# Patient Record
Sex: MALE | Race: WHITE | NOT HISPANIC OR LATINO | Employment: OTHER | URBAN - METROPOLITAN AREA
[De-identification: names, ages, dates, MRNs, and addresses within clinical notes are randomized per-mention and may not be internally consistent; named-entity substitution may affect disease eponyms.]

---

## 2024-04-01 ENCOUNTER — APPOINTMENT (EMERGENCY)
Dept: RADIOLOGY | Facility: HOSPITAL | Age: 74
DRG: 684 | End: 2024-04-01
Payer: COMMERCIAL

## 2024-04-01 ENCOUNTER — HOSPITAL ENCOUNTER (INPATIENT)
Facility: HOSPITAL | Age: 74
LOS: 2 days | Discharge: HOME | DRG: 684 | End: 2024-04-03
Attending: EMERGENCY MEDICINE | Admitting: INTERNAL MEDICINE
Payer: COMMERCIAL

## 2024-04-01 DIAGNOSIS — B02.9 SHINGLES: Primary | ICD-10-CM

## 2024-04-01 DIAGNOSIS — N17.9 AKI (ACUTE KIDNEY INJURY) (HCC): ICD-10-CM

## 2024-04-01 DIAGNOSIS — I10 HTN (HYPERTENSION): ICD-10-CM

## 2024-04-01 PROBLEM — M54.16 LUMBAR RADICULOPATHY: Status: ACTIVE | Noted: 2024-04-01

## 2024-04-01 PROBLEM — R11.0 NAUSEA: Status: ACTIVE | Noted: 2024-04-01

## 2024-04-01 LAB
2HR DELTA HS TROPONIN: -1 NG/L
4HR DELTA HS TROPONIN: 1 NG/L
ALBUMIN SERPL BCP-MCNC: 3.7 G/DL (ref 3.5–5)
ALP SERPL-CCNC: 127 U/L (ref 34–104)
ALT SERPL W P-5'-P-CCNC: 39 U/L (ref 7–52)
AMORPH URATE CRY URNS QL MICRO: ABNORMAL
ANION GAP SERPL CALCULATED.3IONS-SCNC: 13 MMOL/L (ref 4–13)
APTT PPP: 24 SECONDS (ref 23–37)
AST SERPL W P-5'-P-CCNC: 22 U/L (ref 13–39)
ATRIAL RATE: 98 BPM
BACTERIA UR QL AUTO: ABNORMAL /HPF
BASOPHILS # BLD AUTO: 0.06 THOUSANDS/ÂΜL (ref 0–0.1)
BASOPHILS NFR BLD AUTO: 1 % (ref 0–1)
BILIRUB SERPL-MCNC: 1.04 MG/DL (ref 0.2–1)
BILIRUB UR QL STRIP: NEGATIVE
BNP SERPL-MCNC: 12 PG/ML (ref 0–100)
BUN SERPL-MCNC: 29 MG/DL (ref 5–25)
CALCIUM SERPL-MCNC: 8.7 MG/DL (ref 8.4–10.2)
CARDIAC TROPONIN I PNL SERPL HS: 6 NG/L
CARDIAC TROPONIN I PNL SERPL HS: 7 NG/L
CARDIAC TROPONIN I PNL SERPL HS: 8 NG/L
CHLORIDE SERPL-SCNC: 93 MMOL/L (ref 96–108)
CLARITY UR: ABNORMAL
CO2 SERPL-SCNC: 27 MMOL/L (ref 21–32)
COLOR UR: YELLOW
CREAT SERPL-MCNC: 1.67 MG/DL (ref 0.6–1.3)
EOSINOPHIL # BLD AUTO: 0.03 THOUSAND/ÂΜL (ref 0–0.61)
EOSINOPHIL NFR BLD AUTO: 0 % (ref 0–6)
ERYTHROCYTE [DISTWIDTH] IN BLOOD BY AUTOMATED COUNT: 13.2 % (ref 11.6–15.1)
FLUAV RNA RESP QL NAA+PROBE: NEGATIVE
FLUBV RNA RESP QL NAA+PROBE: NEGATIVE
GFR SERPL CREATININE-BSD FRML MDRD: 40 ML/MIN/1.73SQ M
GLUCOSE SERPL-MCNC: 184 MG/DL (ref 65–140)
GLUCOSE UR STRIP-MCNC: NEGATIVE MG/DL
HCT VFR BLD AUTO: 57.2 % (ref 36.5–49.3)
HGB BLD-MCNC: 18.9 G/DL (ref 12–17)
HGB UR QL STRIP.AUTO: NEGATIVE
IMM GRANULOCYTES # BLD AUTO: 0.09 THOUSAND/UL (ref 0–0.2)
IMM GRANULOCYTES NFR BLD AUTO: 1 % (ref 0–2)
INR PPP: 1.11 (ref 0.84–1.19)
KETONES UR STRIP-MCNC: NEGATIVE MG/DL
LACTATE SERPL-SCNC: 1.3 MMOL/L (ref 0.5–2)
LACTATE SERPL-SCNC: 2.7 MMOL/L (ref 0.5–2)
LEUKOCYTE ESTERASE UR QL STRIP: NEGATIVE
LYMPHOCYTES # BLD AUTO: 1 THOUSANDS/ÂΜL (ref 0.6–4.47)
LYMPHOCYTES NFR BLD AUTO: 9 % (ref 14–44)
MCH RBC QN AUTO: 29.9 PG (ref 26.8–34.3)
MCHC RBC AUTO-ENTMCNC: 33 G/DL (ref 31.4–37.4)
MCV RBC AUTO: 90 FL (ref 82–98)
MONOCYTES # BLD AUTO: 0.97 THOUSAND/ÂΜL (ref 0.17–1.22)
MONOCYTES NFR BLD AUTO: 9 % (ref 4–12)
MUCOUS THREADS UR QL AUTO: ABNORMAL
NEUTROPHILS # BLD AUTO: 9.24 THOUSANDS/ÂΜL (ref 1.85–7.62)
NEUTS SEG NFR BLD AUTO: 80 % (ref 43–75)
NITRITE UR QL STRIP: NEGATIVE
NON-SQ EPI CELLS URNS QL MICRO: ABNORMAL /HPF
NRBC BLD AUTO-RTO: 0 /100 WBCS
P AXIS: 50 DEGREES
PH UR STRIP.AUTO: 5 [PH]
PLATELET # BLD AUTO: 105 THOUSANDS/UL (ref 149–390)
PLATELET # BLD AUTO: 169 THOUSANDS/UL (ref 149–390)
PMV BLD AUTO: 10.7 FL (ref 8.9–12.7)
PMV BLD AUTO: 11.5 FL (ref 8.9–12.7)
POTASSIUM SERPL-SCNC: 4.4 MMOL/L (ref 3.5–5.3)
PR INTERVAL: 130 MS
PROCALCITONIN SERPL-MCNC: 0.53 NG/ML
PROT SERPL-MCNC: 7.5 G/DL (ref 6.4–8.4)
PROT UR STRIP-MCNC: ABNORMAL MG/DL
PROTHROMBIN TIME: 14.2 SECONDS (ref 11.6–14.5)
QRS AXIS: 16 DEGREES
QRSD INTERVAL: 82 MS
QT INTERVAL: 356 MS
QTC INTERVAL: 454 MS
RBC # BLD AUTO: 6.33 MILLION/UL (ref 3.88–5.62)
RBC #/AREA URNS AUTO: ABNORMAL /HPF
RSV RNA RESP QL NAA+PROBE: NEGATIVE
SARS-COV-2 RNA RESP QL NAA+PROBE: NEGATIVE
SODIUM SERPL-SCNC: 133 MMOL/L (ref 135–147)
SP GR UR STRIP.AUTO: 1.02 (ref 1–1.03)
T WAVE AXIS: -13 DEGREES
TSH SERPL DL<=0.05 MIU/L-ACNC: 2.63 UIU/ML (ref 0.45–4.5)
UROBILINOGEN UR STRIP-ACNC: <2 MG/DL
VENTRICULAR RATE: 98 BPM
WBC # BLD AUTO: 11.39 THOUSAND/UL (ref 4.31–10.16)
WBC #/AREA URNS AUTO: ABNORMAL /HPF

## 2024-04-01 PROCEDURE — 36415 COLL VENOUS BLD VENIPUNCTURE: CPT

## 2024-04-01 PROCEDURE — 83605 ASSAY OF LACTIC ACID: CPT

## 2024-04-01 PROCEDURE — 99285 EMERGENCY DEPT VISIT HI MDM: CPT

## 2024-04-01 PROCEDURE — 99223 1ST HOSP IP/OBS HIGH 75: CPT | Performed by: INTERNAL MEDICINE

## 2024-04-01 PROCEDURE — 84145 PROCALCITONIN (PCT): CPT

## 2024-04-01 PROCEDURE — 85610 PROTHROMBIN TIME: CPT

## 2024-04-01 PROCEDURE — 84443 ASSAY THYROID STIM HORMONE: CPT | Performed by: INTERNAL MEDICINE

## 2024-04-01 PROCEDURE — 93005 ELECTROCARDIOGRAM TRACING: CPT

## 2024-04-01 PROCEDURE — 84484 ASSAY OF TROPONIN QUANT: CPT

## 2024-04-01 PROCEDURE — 85049 AUTOMATED PLATELET COUNT: CPT

## 2024-04-01 PROCEDURE — 71045 X-RAY EXAM CHEST 1 VIEW: CPT

## 2024-04-01 PROCEDURE — 85730 THROMBOPLASTIN TIME PARTIAL: CPT

## 2024-04-01 PROCEDURE — 93010 ELECTROCARDIOGRAM REPORT: CPT | Performed by: INTERNAL MEDICINE

## 2024-04-01 PROCEDURE — 87040 BLOOD CULTURE FOR BACTERIA: CPT

## 2024-04-01 PROCEDURE — 81001 URINALYSIS AUTO W/SCOPE: CPT | Performed by: INTERNAL MEDICINE

## 2024-04-01 PROCEDURE — 80053 COMPREHEN METABOLIC PANEL: CPT

## 2024-04-01 PROCEDURE — 83880 ASSAY OF NATRIURETIC PEPTIDE: CPT

## 2024-04-01 PROCEDURE — 85025 COMPLETE CBC W/AUTO DIFF WBC: CPT

## 2024-04-01 PROCEDURE — 0241U HB NFCT DS VIR RESP RNA 4 TRGT: CPT

## 2024-04-01 RX ORDER — HYDROCHLOROTHIAZIDE 25 MG/1
25 TABLET ORAL DAILY
COMMUNITY

## 2024-04-01 RX ORDER — HYDROMORPHONE HCL/PF 1 MG/ML
0.5 SYRINGE (ML) INJECTION ONCE
Status: COMPLETED | OUTPATIENT
Start: 2024-04-01 | End: 2024-04-01

## 2024-04-01 RX ORDER — HEPARIN SODIUM 5000 [USP'U]/ML
5000 INJECTION, SOLUTION INTRAVENOUS; SUBCUTANEOUS EVERY 8 HOURS SCHEDULED
Status: DISCONTINUED | OUTPATIENT
Start: 2024-04-01 | End: 2024-04-03 | Stop reason: HOSPADM

## 2024-04-01 RX ORDER — OXYCODONE HYDROCHLORIDE 10 MG/1
10 TABLET ORAL EVERY 4 HOURS PRN
Status: DISCONTINUED | OUTPATIENT
Start: 2024-04-01 | End: 2024-04-03 | Stop reason: HOSPADM

## 2024-04-01 RX ORDER — ENOXAPARIN SODIUM 100 MG/ML
40 INJECTION SUBCUTANEOUS DAILY
Status: DISCONTINUED | OUTPATIENT
Start: 2024-04-01 | End: 2024-04-01

## 2024-04-01 RX ORDER — PREDNISONE 20 MG/1
40 TABLET ORAL ONCE
Status: COMPLETED | OUTPATIENT
Start: 2024-04-01 | End: 2024-04-01

## 2024-04-01 RX ORDER — DILTIAZEM HYDROCHLORIDE 240 MG/1
240 CAPSULE, COATED, EXTENDED RELEASE ORAL DAILY
Status: DISCONTINUED | OUTPATIENT
Start: 2024-04-02 | End: 2024-04-03 | Stop reason: HOSPADM

## 2024-04-01 RX ORDER — ONDANSETRON 2 MG/ML
4 INJECTION INTRAMUSCULAR; INTRAVENOUS EVERY 6 HOURS PRN
Status: DISCONTINUED | OUTPATIENT
Start: 2024-04-01 | End: 2024-04-03 | Stop reason: HOSPADM

## 2024-04-01 RX ORDER — OXYCODONE HYDROCHLORIDE 5 MG/1
5 TABLET ORAL EVERY 4 HOURS PRN
Status: DISCONTINUED | OUTPATIENT
Start: 2024-04-01 | End: 2024-04-03 | Stop reason: HOSPADM

## 2024-04-01 RX ORDER — VALACYCLOVIR HYDROCHLORIDE 1 G/1
1000 TABLET, FILM COATED ORAL EVERY 12 HOURS
Status: DISCONTINUED | OUTPATIENT
Start: 2024-04-01 | End: 2024-04-03 | Stop reason: HOSPADM

## 2024-04-01 RX ORDER — SODIUM CHLORIDE, SODIUM GLUCONATE, SODIUM ACETATE, POTASSIUM CHLORIDE, MAGNESIUM CHLORIDE, SODIUM PHOSPHATE, DIBASIC, AND POTASSIUM PHOSPHATE .53; .5; .37; .037; .03; .012; .00082 G/100ML; G/100ML; G/100ML; G/100ML; G/100ML; G/100ML; G/100ML
1000 INJECTION, SOLUTION INTRAVENOUS ONCE
Status: COMPLETED | OUTPATIENT
Start: 2024-04-01 | End: 2024-04-01

## 2024-04-01 RX ORDER — DILTIAZEM HYDROCHLORIDE 240 MG/1
240 CAPSULE, COATED, EXTENDED RELEASE ORAL DAILY
COMMUNITY

## 2024-04-01 RX ORDER — ACETAMINOPHEN 325 MG/1
650 TABLET ORAL EVERY 6 HOURS PRN
Status: DISCONTINUED | OUTPATIENT
Start: 2024-04-01 | End: 2024-04-03 | Stop reason: HOSPADM

## 2024-04-01 RX ORDER — SODIUM CHLORIDE, SODIUM GLUCONATE, SODIUM ACETATE, POTASSIUM CHLORIDE, MAGNESIUM CHLORIDE, SODIUM PHOSPHATE, DIBASIC, AND POTASSIUM PHOSPHATE .53; .5; .37; .037; .03; .012; .00082 G/100ML; G/100ML; G/100ML; G/100ML; G/100ML; G/100ML; G/100ML
100 INJECTION, SOLUTION INTRAVENOUS CONTINUOUS
Status: DISPENSED | OUTPATIENT
Start: 2024-04-01 | End: 2024-04-03

## 2024-04-01 RX ORDER — SENNOSIDES 8.6 MG
2 TABLET ORAL DAILY
Status: DISCONTINUED | OUTPATIENT
Start: 2024-04-02 | End: 2024-04-03 | Stop reason: HOSPADM

## 2024-04-01 RX ORDER — DOCUSATE SODIUM 100 MG/1
100 CAPSULE, LIQUID FILLED ORAL 2 TIMES DAILY
Status: DISCONTINUED | OUTPATIENT
Start: 2024-04-01 | End: 2024-04-03 | Stop reason: HOSPADM

## 2024-04-01 RX ADMIN — PREDNISONE 40 MG: 20 TABLET ORAL at 12:37

## 2024-04-01 RX ADMIN — SODIUM CHLORIDE, SODIUM GLUCONATE, SODIUM ACETATE, POTASSIUM CHLORIDE, MAGNESIUM CHLORIDE, SODIUM PHOSPHATE, DIBASIC, AND POTASSIUM PHOSPHATE 1000 ML: .53; .5; .37; .037; .03; .012; .00082 INJECTION, SOLUTION INTRAVENOUS at 14:54

## 2024-04-01 RX ADMIN — HYDROMORPHONE HYDROCHLORIDE 0.5 MG: 1 INJECTION, SOLUTION INTRAMUSCULAR; INTRAVENOUS; SUBCUTANEOUS at 14:53

## 2024-04-01 RX ADMIN — SODIUM CHLORIDE, SODIUM GLUCONATE, SODIUM ACETATE, POTASSIUM CHLORIDE, MAGNESIUM CHLORIDE, SODIUM PHOSPHATE, DIBASIC, AND POTASSIUM PHOSPHATE 100 ML/HR: .53; .5; .37; .037; .03; .012; .00082 INJECTION, SOLUTION INTRAVENOUS at 18:44

## 2024-04-01 RX ADMIN — VALACYCLOVIR HYDROCHLORIDE 1000 MG: 1 TABLET, FILM COATED ORAL at 16:00

## 2024-04-01 NOTE — ASSESSMENT & PLAN NOTE
Likely due to prerenal azotemia due to poor appetite and  HCTZ use.  Hold hctz  IVF hydration  Check bladder scan, retention protocol  Check Ua, feNa

## 2024-04-01 NOTE — ED ATTENDING ATTESTATION
4/1/2024  I, Memo Woodard MD, saw and evaluated the patient. I have discussed the patient with the resident/non-physician practitioner and agree with the resident's/non-physician practitioner's findings, Plan of Care, and MDM as documented in the resident's/non-physician practitioner's note, except where noted. All available labs and Radiology studies were reviewed.  I was present for key portions of any procedure(s) performed by the resident/non-physician practitioner and I was immediately available to provide assistance.       At this point I agree with the current assessment done in the Emergency Department.  I have conducted an independent evaluation of this patient a history and physical is as follows:    ED Course         Critical Care Time  Procedures    72 yo male with right flank pain and rash for few days consistent with shingles. Pt then also having fever, sob and chest pain since. Pt noted to have hypoxia.  Pt with no abdominal pain, no n/v/d.  Pmh chf, htn, vss, afebrile,tachy, lungs with crackles bilaterally, rrr, abdomen soft nontender. Septic workup, viral swab. Valtrex, possible chf, cxr.

## 2024-04-01 NOTE — ED PROVIDER NOTES
History  Chief Complaint   Patient presents with    Rash     Patient has a rash on his R hip that is red and in small raised clusters. Patient also has not slept in 4 days because of the back pain.     Shortness of Breath     Patient experiencing SOB during triage process. Brought back to room right away and placed on 4 L NC -     Patient is a 73-year-old male with past medical history significant for CHF, hypertension presenting to the emergency department for evaluation of a rash on his right hip.  Patient is exquisitely tender and has not slept because of the pain.  Patient also notes shortness of breath over the past several days.  Patient notes he feels like his legs have been getting swollen too.  He denies any fevers or chills chest pain or nausea vomiting diarrhea constipation dysuria or hematuria.        None       No past medical history on file.    No past surgical history on file.    No family history on file.  I have reviewed and agree with the history as documented.    No existing history information found.  No existing history information found.        Review of Systems   Constitutional:  Positive for activity change and fatigue. Negative for chills and fever.   HENT:  Negative for congestion, ear pain and sore throat.    Eyes:  Negative for pain and visual disturbance.   Respiratory:  Positive for shortness of breath. Negative for cough and chest tightness.    Cardiovascular:  Positive for leg swelling. Negative for chest pain and palpitations.   Gastrointestinal:  Negative for abdominal pain, constipation, diarrhea, nausea and vomiting.   Genitourinary:  Negative for dysuria and hematuria.   Musculoskeletal:  Positive for back pain. Negative for arthralgias.   Skin:  Positive for rash. Negative for color change.   Neurological:  Negative for dizziness, seizures, syncope, weakness, light-headedness and headaches.   Psychiatric/Behavioral:  Negative for agitation and behavioral problems.    All  other systems reviewed and are negative.      Physical Exam  ED Triage Vitals [04/01/24 1152]   Temperature Pulse Respirations Blood Pressure SpO2   (!) 97.2 °F (36.2 °C) 96 (!) 28 142/93 (!) 84 %      Temp Source Heart Rate Source Patient Position - Orthostatic VS BP Location FiO2 (%)   Temporal Monitor Sitting Left arm --      Pain Score       10 - Worst Possible Pain             Orthostatic Vital Signs  Vitals:    04/01/24 1152 04/01/24 1200 04/01/24 1224   BP: 142/93 (!) 200/108    Pulse: 96 (!) 107 97   Patient Position - Orthostatic VS: Sitting Lying        Physical Exam  Vitals and nursing note reviewed.   Constitutional:       General: He is not in acute distress.     Appearance: He is well-developed. He is ill-appearing.      Comments: Chronically ill-appearing   HENT:      Head: Normocephalic and atraumatic.   Eyes:      Conjunctiva/sclera: Conjunctivae normal.   Cardiovascular:      Rate and Rhythm: Normal rate and regular rhythm.      Heart sounds: No murmur heard.  Pulmonary:      Effort: Respiratory distress present.      Breath sounds: Examination of the right-lower field reveals rales. Examination of the left-lower field reveals rales. Rales present.   Chest:      Chest wall: No mass or tenderness.   Abdominal:      Palpations: Abdomen is soft.      Tenderness: There is no abdominal tenderness.   Musculoskeletal:         General: No swelling.      Cervical back: Neck supple.      Right lower leg: No tenderness. Edema present.      Left lower leg: No tenderness. Edema present.   Skin:     General: Skin is warm and dry.      Capillary Refill: Capillary refill takes less than 2 seconds.      Findings: Rash present.      Comments: Shingles appearing rash noted on the right lower abdomen and flank radiating towards the middle of the abdomen there are clusters consistent with shingles.  Tenderness to palpation.  There is no surrounding ecchymoses or cellulitic process noted.   Neurological:      General:  No focal deficit present.      Mental Status: He is alert.   Psychiatric:         Mood and Affect: Mood normal.         ED Medications  Medications   valACYclovir (VALTREX) tablet 1,000 mg (has no administration in time range)   HYDROmorphone (DILAUDID) injection 0.5 mg (has no administration in time range)   multi-electrolyte (ISOLYTE-S PH 7.4) bolus 1,000 mL (has no administration in time range)   predniSONE tablet 40 mg (40 mg Oral Given 4/1/24 1237)       Diagnostic Studies  Results Reviewed       Procedure Component Value Units Date/Time    Protime-INR [[id number]]  (Normal) Collected: 04/01/24 1215    Lab Status: Final result Specimen: Blood from Arm, Right Updated: 04/01/24 1351     Protime 14.2 seconds      INR 1.11    APTT [[id number]]  (Normal) Collected: 04/01/24 1215    Lab Status: Final result Specimen: Blood from Arm, Right Updated: 04/01/24 1351     PTT 24 seconds     CBC and differential [[id number]]  (Abnormal) Collected: 04/01/24 1215    Lab Status: Final result Specimen: Blood from Arm, Right Updated: 04/01/24 1339     WBC 11.39 Thousand/uL      RBC 6.33 Million/uL      Hemoglobin 18.9 g/dL      Hematocrit 57.2 %      MCV 90 fL      MCH 29.9 pg      MCHC 33.0 g/dL      RDW 13.2 %      MPV 10.7 fL      Platelets 169 Thousands/uL      nRBC 0 /100 WBCs      Neutrophils Relative 80 %      Immature Grans % 1 %      Lymphocytes Relative 9 %      Monocytes Relative 9 %      Eosinophils Relative 0 %      Basophils Relative 1 %      Neutrophils Absolute 9.24 Thousands/µL      Absolute Immature Grans 0.09 Thousand/uL      Absolute Lymphocytes 1.00 Thousands/µL      Absolute Monocytes 0.97 Thousand/µL      Eosinophils Absolute 0.03 Thousand/µL      Basophils Absolute 0.06 Thousands/µL     B-Type Natriuretic Peptide(BNP) [[id number]] Collected: 04/01/24 1215    Lab Status: In process Specimen: Blood from Arm, Right Updated: 04/01/24 1339    FLU/RSV/COVID - if FLU/RSV clinically relevant [[id number]]  (Normal)  Collected: 04/01/24 1215    Lab Status: Final result Specimen: Nares from Nose Updated: 04/01/24 1331     SARS-CoV-2 Negative     INFLUENZA A PCR Negative     INFLUENZA B PCR Negative     RSV PCR Negative    Narrative:      FOR PEDIATRIC PATIENTS - copy/paste COVID Guidelines URL to browser: [email or web address]    SARS-CoV-2 assay is a Nucleic Acid Amplification assay intended for the  qualitative detection of nucleic acid from SARS-CoV-2 in nasopharyngeal  swabs. Results are for the presumptive identification of SARS-CoV-2 RNA.    Positive results are indicative of infection with SARS-CoV-2, the virus  causing COVID-19, but do not rule out bacterial infection or co-infection  with other viruses. Laboratories within the [place] and its  territories are required to report all positive results to the appropriate  public health authorities. Negative results do not preclude SARS-CoV-2  infection and should not be used as the sole basis for treatment or other  patient management decisions. Negative results must be combined with  clinical observations, patient history, and epidemiological information.  This test has not been FDA cleared or approved.    This test has been authorized by FDA under an Emergency Use Authorization  (EUA). This test is only authorized for the duration of time the  declaration that circumstances exist justifying the authorization of the  emergency use of an in vitro diagnostic tests for detection of SARS-CoV-2  virus and/or diagnosis of COVID-19 infection under section 564(b)(1) of  the Act, 21 U.S.C. 360bbb-3(b)(1), unless the authorization is terminated  or revoked sooner. The test has been validated but independent review by FDA  and CLIA is pending.    Test performed using [facility] This RT-PCR assay targets N2,  a region unique to SARS-CoV-2. A conserved region in the E-gene was chosen  for pan-Sarbecovirus detection which  includes SARS-CoV-2.    According to CMS-2020-01-R, this platform meets the definition of high-throughput technology.    HS Troponin 0hr (reflex protocol) [[id number]]  (Normal) Collected: 04/01/24 1215    Lab Status: Final result Specimen: Blood from Arm, Right Updated: 04/01/24 1318     hs TnI 0hr 7 ng/L     HS Troponin I 2hr [[id number]]     Lab Status: No result Specimen: Blood     Lactic acid [[id number]]  (Abnormal) Collected: 04/01/24 1215    Lab Status: Final result Specimen: Blood from Arm, Right Updated: 04/01/24 1318     LACTIC ACID 2.7 mmol/L     Narrative:      Result may be elevated if tourniquet was used during collection.    Lactic acid 2 Hours [[id number]]     Lab Status: No result Specimen: Blood     Comprehensive metabolic panel [[id number]]  (Abnormal) Collected: 04/01/24 1215    Lab Status: Final result Specimen: Blood from Arm, Right Updated: 04/01/24 1312     Sodium 133 mmol/L      Potassium 4.4 mmol/L      Chloride 93 mmol/L      CO2 27 mmol/L      ANION GAP 13 mmol/L      BUN 29 mg/dL      Creatinine 1.67 mg/dL      Glucose 184 mg/dL      Calcium 8.7 mg/dL      AST 22 U/L      ALT 39 U/L      Alkaline Phosphatase 127 U/L      Total Protein 7.5 g/dL      Albumin 3.7 g/dL      Total Bilirubin 1.04 mg/dL      eGFR 40 ml/min/1.73sq m     Narrative:      National Kidney Disease Foundation guidelines for Chronic Kidney Disease (CKD):     Stage 1 with normal or high GFR (GFR > 90 mL/min/1.73 square meters)    Stage 2 Mild CKD (GFR = 60-89 mL/min/1.73 square meters)    Stage 3A Moderate CKD (GFR = 45-59 mL/min/1.73 square meters)    Stage 3B Moderate CKD (GFR = 30-44 mL/min/1.73 square meters)    Stage 4 Severe CKD (GFR = 15-29 mL/min/1.73 square meters)    Stage 5 End Stage CKD (GFR <15 mL/min/1.73 square meters)  Note: GFR calculation is accurate only with a steady state creatinine    Procalcitonin [[id number]] Collected: 04/01/24 1215    Lab Status: In process Specimen: Blood from Arm, Right Updated:  04/01/24 1257    Blood culture #2 [[id number]] Collected: 04/01/24 1215    Lab Status: In process Specimen: Blood from Arm, Right Updated: 04/01/24 1244    Blood culture #1 [[id number]] Collected: 04/01/24 1239    Lab Status: In process Specimen: Blood from Arm, Left Updated: 04/01/24 1244    UA w Reflex to Microscopic w Reflex to Culture [[id number]]     Lab Status: No result Specimen: Urine                    XR chest 1 view portable   ED Interpretation by Annette Maria Palladino, DO (04/01 1328)   No acute obvious abnormalities noted.            Procedures  ECG 12 Lead Documentation Only    Date/Time: 4/1/2024 12:34 PM    Performed by: Annette Maria Palladino, DO  Authorized by: Annette Maria Palladino, DO    Indications / Diagnosis:  Sob  ECG reviewed by me, the ED Provider: yes    Patient location:  ED  Previous ECG:     Previous ECG:  Unavailable  Interpretation:     Interpretation: normal    Rate:     ECG rate:  98    ECG rate assessment: normal    Rhythm:     Rhythm: sinus rhythm    Ectopy:     Ectopy: none    QRS:     QRS axis:  Normal    QRS intervals:  Normal  Conduction:     Conduction: normal    ST segments:     ST segments:  Non-specific  T waves:     T waves: non-specific          ED Course  ED Course as of 04/01/24 1409   Mon Apr 01, 2024   1205 Blood Pressure: 142/93   1205 Temperature(!): 97.2 °F (36.2 °C)   1205 Temp Source: Temporal   1205 Pulse: 66   1205 Respirations(!): 28   1205 SpO2(!): 84 %   1229 - Given patient's concerns, will do a cardiac workup.   - Will do an EKG for arrythmia, strain; troponin for same as per protocol for evaluation of ACS.   - CBC for anemia; CMP for kidney function and electrolytes.   - Will check CXR for pneumonia, PTX, fluid overload  - HEART score  - BNP to evaluate fluid overload  - Given vital signs initially tachycardic, hypoxic, with abnormal vital signs would evaluate with septic workup consisting of UA with reflex to culture, blood cultures, lactic, Pro-Brodie,  APTT/PT/INR  -COVID flu RSV swab evaluate for viral causes  -BMP to evaluate for fluid overload as well.  Patient is aware he will likely be staying in the hospital for further evaluation.  -Patient's wife is immunocompromised given his shingles advised him to avoid.  Valtrex given as well as prednisone for pain.  - Disposition per workup.    1231 Pulse: 97   1231 Respirations: 22   1231 SpO2: 98 %  Patient on room air currently however still looks like he has increased work of breathing.  Will evaluate renal function before diuresing however clinically patient does appear to look like fluid overload.   1312 Blood Pressure(!): 200/108   1312 Pulse(!): 107   1312 Respirations(!): 24   1312 SpO2: 94 %   1313 Creatinine(!): 1.67  New DARIELA, reviewed outside hospital labs   1313 BUN(!): 29   1319 LACTIC ACID(!!): 2.7  Doubt this is secondary to sepsis. I believe this is volume down, will give fluid bolus.    1319 hs TnI 0hr: 7   1332 SARS-COV-2: Negative   1332 INFLU A PCR: Negative   1332 INFLU B PCR: Negative   1332 RSV PCR: Negative   1335 Reached out to [facility] for admission. Patient aware of findings and need for admission.   1340 WBC(!): 11.39   1340 Hemoglobin(!): 18.9  hemoconcetrated   1408 Patient admitted to SOD stepdown to.  Patient aware of all laboratory and imaging findings.  All questions concerns answered.  Patient stable for admission.             HEART Risk Score      Flowsheet Row Most Recent Value   Heart Score Risk Calculator    History 0 Filed at: 04/01/2024 1328   ECG 1 Filed at: 04/01/2024 1328   Age 2 Filed at: 04/01/2024 1328   Risk Factors 2 Filed at: 04/01/2024 1328   Troponin 0 Filed at: 04/01/2024 1328   HEART Score 5 Filed at: 04/01/2024 1328                        SBIRT 20yo+      Flowsheet Row Most Recent Value   Initial Alcohol Screen: US AUDIT-C     1. How often do you have a drink containing alcohol? 0 Filed at: 04/01/2024 1258   3b. FEMALE Any Age, or MALE 65+: How often do you have 4 or  more drinks on one occassion? 0 Filed at: 04/01/2024 1258   Audit-C Score 0 Filed at: 04/01/2024 1258   BOBO: How many times in the past year have you...    Used an illegal drug or used a prescription medication for non-medical reasons? Never Filed at: 04/01/2024 1258                  Medical Decision Making  Amount and/or Complexity of Data Reviewed  Labs: ordered. Decision-making details documented in ED Course.  Radiology: ordered and independent interpretation performed.    Risk  Prescription drug management.  Decision regarding hospitalization.          Disposition  Final diagnoses:   Shingles   DARIELA (acute kidney injury) (HCC)   HTN (hypertension)     Time reflects when diagnosis was documented in both MDM as applicable and the Disposition within this note       Time User Action Codes Description Comment    4/1/2024 12:11 PM Palladino, Annette Add [B02.9] Shingles     4/1/2024 12:11 PM Palladino, Annette Add [R09.02] Hypoxia     4/1/2024  1:14 PM Palladino, Annette Add [N17.9] DARIELA (acute kidney injury) (HCC)     4/1/2024  2:07 PM Palladino, Annette Remove [R09.02] Hypoxia     4/1/2024  2:08 PM Palladino, Annette Add [R03.0] Elevated BP without diagnosis of hypertension     4/1/2024  2:08 PM Palladino, Annette Remove [R03.0] Elevated BP without diagnosis of hypertension     4/1/2024  2:08 PM Palladino, Annette Add [I10] HTN (hypertension)           ED Disposition       ED Disposition   Admit    Condition   Stable    Date/Time   Mon Apr 1, 2024 1211    Comment   Case was discussed              Follow-up Information    None         Patient's Medications    No medications on file     No discharge procedures on file.    PDMP Review       None             ED Provider  Attending physically available and evaluated Bharat Ordoñez. I managed the patient along with the ED Attending.    Electronically Signed by           Annette Maria Palladino, DO  04/01/24 9708

## 2024-04-01 NOTE — ASSESSMENT & PLAN NOTE
Unclear etiology  Abdomen is soft but distended, reports last BM was yesterday  Check stool burden on KUB  Bowel regimen  Prn zofran  Monitor for tolerance of diet  Consider CT scan if unresolved

## 2024-04-01 NOTE — H&P
[facility]  H&P  Name: Bharat Ordoñez 73 y.o. male I MRN: [id number]  Unit/Bed#: [facility] I Date of Admission: 4/1/2024   Date of Service: 4/1/2024 I Hospital Day: 0      Assessment/Plan   * Shingles  Assessment & Plan  New right lower back rash consistent with shingles  Possibly precipitated by a recent steroid course he used for his radiculopathy  Valtrex 1g TID x 7 days  PRN analgesics    Acute renal failure (HCC)  Assessment & Plan  Likely due to prerenal azotemia due to poor appetite and  HCTZ use.  Hold hctz  IVF hydration  Check bladder scan, retention protocol  Check Ua, feNa    Nausea  Assessment & Plan  Unclear etiology  Abdomen is soft but distended, reports last BM was yesterday  Check stool burden on KUB  Bowel regimen  Prn zofran  Monitor for tolerance of diet  Consider CT scan if unresolved    HTN (hypertension)  Assessment & Plan  Continue diltiazem 240mg daily  Holding hctz due to DARIELA    Lumbar radiculopathy  Assessment & Plan  Chronic right sided lumbar spine with radiculopathy  Follows with neurosurgery in [place] where he resides  Recent steroid taper was terminated early due to side affects  PRN analgesics for now.          VTE Pharmacologic Prophylaxis:   Moderate Risk (Score 3-4) - Pharmacological DVT Prophylaxis Ordered: heparin.  Code Status: Level 1 - Full Code     Anticipated Length of Stay: Patient will be admitted on an inpatient basis with an anticipated length of stay of greater than 2 midnights secondary to uncontrolled pain, DARIELA.    Total Time Spent on Date of Encounter in care of patient: 45 mins. This time was spent on one or more of the following: performing physical exam; counseling and coordination of care; obtaining or reviewing history; documenting in the medical record; reviewing/ordering tests, medications or procedures; communicating with other healthcare professionals and discussing with patient's family/caregivers.    Chief Complaint:  "back pain    History of Present Illness:  Bharat Ordoñez is a 73 y.o. male with a PMH of lumbar radiculopathy who presents with back pain. He is visiting from [place] here \"for workmans comp\" and was participating in physical therapy. He reports worsening lower back pain and was noted by a nurse there to have a rash of the lower back. Due to suspicion of shingles he was referred to the ED. He also reports chronic pain, nausea, poor appetite. Denies diarrhea. Last BM yesterday    Review of Systems:  Review of Systems   All other systems reviewed and are negative.      Past Medical and Surgical History:   No past medical history on file.    No past surgical history on file.    Meds/Allergies:  Prior to Admission medications    Medication Sig Start Date End Date Taking? Authorizing Provider   diltiazem (CARDIZEM CD) 240 mg 24 hr capsule Take 240 mg by mouth daily   Yes Historical Provider, MD   hydroCHLOROthiazide 25 mg tablet Take 25 mg by mouth daily   Yes Historical Provider, MD     I have reviewed home medications with patient personally.    Allergies: No Known Allergies    Social History:  Marital Status: /Civil Union   Occupation: retired  Patient Pre-hospital Living Situation: Home  Patient Pre-hospital Level of Mobility: walks  Patient Pre-hospital Diet Restrictions: None  Substance Use History:   Social History     Substance and Sexual Activity   Alcohol Use Not on file     Social History     Tobacco Use   Smoking Status Not on file   Smokeless Tobacco Not on file     Social History     Substance and Sexual Activity   Drug Use Not on file       Family History:  No family history on file.    Physical Exam:     Vitals:   Blood Pressure: (!) 175/109 (04/01/24 1600)  Pulse: 94 (04/01/24 1600)  Temperature: (!) 97.2 °F (36.2 °C) (04/01/24 1152)  Temp Source: Temporal (04/01/24 1152)  Respirations: (!) 25 (04/01/24 1600)  SpO2: 96 % (04/01/24 1600)    Physical Exam  Constitutional:       Appearance: Normal " appearance.   HENT:      Head: Normocephalic and atraumatic.      Nose: Nose normal.   Eyes:      Extraocular Movements: Extraocular movements intact.   Cardiovascular:      Rate and Rhythm: Normal rate and regular rhythm.   Pulmonary:      Effort: Pulmonary effort is normal.      Breath sounds: No wheezing or rales.   Abdominal:      General: There is no distension.      Palpations: Abdomen is soft.      Tenderness: There is no abdominal tenderness.   Musculoskeletal:         General: Normal range of motion.      Right lower leg: No edema.      Left lower leg: No edema.   Skin:     Findings: Rash present.      Comments: Dermatomal rash right lower back   Neurological:      Mental Status: He is alert and oriented to person, place, and time.   Psychiatric:         Mood and Affect: Mood normal.         Behavior: Behavior normal.          Additional Data:     Lab Results:  Results from last 7 days   Lab Units 04/01/24  1215   WBC Thousand/uL 11.39*   HEMOGLOBIN g/dL 18.9*   HEMATOCRIT % 57.2*   PLATELETS Thousands/uL 169   NEUTROS PCT % 80*   LYMPHS PCT % 9*   MONOS PCT % 9   EOS PCT % 0     Results from last 7 days   Lab Units 04/01/24  1215   SODIUM mmol/L 133*   POTASSIUM mmol/L 4.4   CHLORIDE mmol/L 93*   CO2 mmol/L 27   BUN mg/dL 29*   CREATININE mg/dL 1.67*   ANION GAP mmol/L 13   CALCIUM mg/dL 8.7   ALBUMIN g/dL 3.7   TOTAL BILIRUBIN mg/dL 1.04*   ALK PHOS U/L 127*   ALT U/L 39   AST U/L 22   GLUCOSE RANDOM mg/dL 184*     Results from last 7 days   Lab Units 04/01/24  1215   INR  1.11             Results from last 7 days   Lab Units 04/01/24  1458 04/01/24  1215   LACTIC ACID mmol/L 1.3 2.7*   PROCALCITONIN ng/ml  --  0.53*       Lines/Drains:  Invasive Devices       Peripheral Intravenous Line  Duration             Peripheral IV 04/01/24 Left Antecubital <1 day    Peripheral IV 04/01/24 Right Antecubital <1 day                        Imaging: Reviewed radiology reports from this admission including: chest  xray  XR chest 1 view portable   ED Interpretation by Annette Maria Palladino, DO (04/01 1328)   No acute obvious abnormalities noted.      Final Result by Kristian Huang MD (04/01 6506)      No acute cardiopulmonary disease.            Workstation performed: [id number]         XR abdomen 1 view kub    (Results Pending)       EKG and Other Studies Reviewed on Admission:   EKG: NSR. HR 98.    ** Please Note: This note has been constructed using a voice recognition system. **

## 2024-04-01 NOTE — ASSESSMENT & PLAN NOTE
Chronic right sided lumbar spine with radiculopathy  Follows with neurosurgery in [place] where he resides  Recent steroid taper was terminated early due to side affects  PRN analgesics for now.

## 2024-04-01 NOTE — ASSESSMENT & PLAN NOTE
New right lower back rash consistent with shingles  Possibly precipitated by a recent steroid course he used for his radiculopathy  Valtrex 1g TID x 7 days  PRN analgesics

## 2024-04-02 ENCOUNTER — APPOINTMENT (INPATIENT)
Dept: RADIOLOGY | Facility: HOSPITAL | Age: 74
DRG: 684 | End: 2024-04-02
Payer: COMMERCIAL

## 2024-04-02 LAB
ALBUMIN SERPL BCP-MCNC: 2.8 G/DL (ref 3.5–5)
ALP SERPL-CCNC: 89 U/L (ref 34–104)
ALT SERPL W P-5'-P-CCNC: 32 U/L (ref 7–52)
ANION GAP SERPL CALCULATED.3IONS-SCNC: 7 MMOL/L (ref 4–13)
AST SERPL W P-5'-P-CCNC: 18 U/L (ref 13–39)
BASOPHILS # BLD AUTO: 0.02 THOUSANDS/ÂΜL (ref 0–0.1)
BASOPHILS NFR BLD AUTO: 0 % (ref 0–1)
BILIRUB SERPL-MCNC: 0.57 MG/DL (ref 0.2–1)
BUN SERPL-MCNC: 31 MG/DL (ref 5–25)
CALCIUM ALBUM COR SERPL-MCNC: 8.6 MG/DL (ref 8.3–10.1)
CALCIUM SERPL-MCNC: 7.6 MG/DL (ref 8.4–10.2)
CHLORIDE SERPL-SCNC: 101 MMOL/L (ref 96–108)
CO2 SERPL-SCNC: 25 MMOL/L (ref 21–32)
CREAT SERPL-MCNC: 1.15 MG/DL (ref 0.6–1.3)
EOSINOPHIL # BLD AUTO: 0.06 THOUSAND/ÂΜL (ref 0–0.61)
EOSINOPHIL NFR BLD AUTO: 1 % (ref 0–6)
ERYTHROCYTE [DISTWIDTH] IN BLOOD BY AUTOMATED COUNT: 13.3 % (ref 11.6–15.1)
GFR SERPL CREATININE-BSD FRML MDRD: 63 ML/MIN/1.73SQ M
GLUCOSE SERPL-MCNC: 183 MG/DL (ref 65–140)
HCT VFR BLD AUTO: 46.6 % (ref 36.5–49.3)
HGB BLD-MCNC: 15.7 G/DL (ref 12–17)
IMM GRANULOCYTES # BLD AUTO: 0.07 THOUSAND/UL (ref 0–0.2)
IMM GRANULOCYTES NFR BLD AUTO: 1 % (ref 0–2)
LYMPHOCYTES # BLD AUTO: 0.89 THOUSANDS/ÂΜL (ref 0.6–4.47)
LYMPHOCYTES NFR BLD AUTO: 11 % (ref 14–44)
MAGNESIUM SERPL-MCNC: 2 MG/DL (ref 1.9–2.7)
MCH RBC QN AUTO: 30.2 PG (ref 26.8–34.3)
MCHC RBC AUTO-ENTMCNC: 33.7 G/DL (ref 31.4–37.4)
MCV RBC AUTO: 90 FL (ref 82–98)
MONOCYTES # BLD AUTO: 1.03 THOUSAND/ÂΜL (ref 0.17–1.22)
MONOCYTES NFR BLD AUTO: 13 % (ref 4–12)
NEUTROPHILS # BLD AUTO: 6.2 THOUSANDS/ÂΜL (ref 1.85–7.62)
NEUTS SEG NFR BLD AUTO: 74 % (ref 43–75)
NRBC BLD AUTO-RTO: 0 /100 WBCS
PHOSPHATE SERPL-MCNC: 2.6 MG/DL (ref 2.3–4.1)
PLATELET # BLD AUTO: 123 THOUSANDS/UL (ref 149–390)
PMV BLD AUTO: 11.1 FL (ref 8.9–12.7)
POTASSIUM SERPL-SCNC: 4.2 MMOL/L (ref 3.5–5.3)
PROT SERPL-MCNC: 5.4 G/DL (ref 6.4–8.4)
RBC # BLD AUTO: 5.2 MILLION/UL (ref 3.88–5.62)
SODIUM SERPL-SCNC: 133 MMOL/L (ref 135–147)
WBC # BLD AUTO: 8.27 THOUSAND/UL (ref 4.31–10.16)

## 2024-04-02 PROCEDURE — 84100 ASSAY OF PHOSPHORUS: CPT

## 2024-04-02 PROCEDURE — 97167 OT EVAL HIGH COMPLEX 60 MIN: CPT

## 2024-04-02 PROCEDURE — 74018 RADEX ABDOMEN 1 VIEW: CPT

## 2024-04-02 PROCEDURE — 97163 PT EVAL HIGH COMPLEX 45 MIN: CPT

## 2024-04-02 PROCEDURE — 80053 COMPREHEN METABOLIC PANEL: CPT

## 2024-04-02 PROCEDURE — 85025 COMPLETE CBC W/AUTO DIFF WBC: CPT

## 2024-04-02 PROCEDURE — 99232 SBSQ HOSP IP/OBS MODERATE 35: CPT | Performed by: PHYSICIAN ASSISTANT

## 2024-04-02 PROCEDURE — 83735 ASSAY OF MAGNESIUM: CPT

## 2024-04-02 RX ADMIN — HEPARIN SODIUM 5000 UNITS: 5000 INJECTION INTRAVENOUS; SUBCUTANEOUS at 05:36

## 2024-04-02 RX ADMIN — VALACYCLOVIR HYDROCHLORIDE 1000 MG: 1 TABLET, FILM COATED ORAL at 03:29

## 2024-04-02 RX ADMIN — VALACYCLOVIR HYDROCHLORIDE 1000 MG: 1 TABLET, FILM COATED ORAL at 14:56

## 2024-04-02 RX ADMIN — DILTIAZEM HYDROCHLORIDE 240 MG: 240 CAPSULE, COATED, EXTENDED RELEASE ORAL at 08:33

## 2024-04-02 RX ADMIN — SODIUM CHLORIDE, SODIUM GLUCONATE, SODIUM ACETATE, POTASSIUM CHLORIDE, MAGNESIUM CHLORIDE, SODIUM PHOSPHATE, DIBASIC, AND POTASSIUM PHOSPHATE 100 ML/HR: .53; .5; .37; .037; .03; .012; .00082 INJECTION, SOLUTION INTRAVENOUS at 08:37

## 2024-04-02 RX ADMIN — OXYCODONE HYDROCHLORIDE 5 MG: 5 TABLET ORAL at 08:33

## 2024-04-02 RX ADMIN — SODIUM CHLORIDE, SODIUM GLUCONATE, SODIUM ACETATE, POTASSIUM CHLORIDE, MAGNESIUM CHLORIDE, SODIUM PHOSPHATE, DIBASIC, AND POTASSIUM PHOSPHATE 100 ML/HR: .53; .5; .37; .037; .03; .012; .00082 INJECTION, SOLUTION INTRAVENOUS at 19:47

## 2024-04-02 NOTE — PHYSICAL THERAPY NOTE
Physical Therapy Evaluation     Patient's Name: Bharat Ordoñez    Admitting Diagnosis  Shortness of breath [R06.02]  Shingles [B02.9]  Rash [R21]  HTN (hypertension) [I10]  DARIELA (acute kidney injury) (HCC) [N17.9]    Problem List  Patient Active Problem List   Diagnosis    Shingles    Acute renal failure (HCC)    Nausea    HTN (hypertension)    Lumbar radiculopathy       Past Medical History  History reviewed. No pertinent past medical history.    Past Surgical History  History reviewed. No pertinent surgical history.         04/02/24 1224   PT Last Visit   PT Visit Date 04/02/24   Note Type   Note type Evaluation   Pain Assessment   Pain Assessment Tool 0-10   Pain Score 7   Pain Location/Orientation Location: Back  (L LE)   Pain Onset/Description Onset: Ongoing   Patient's Stated Pain Goal No pain   Hospital Pain Intervention(s) Repositioned;Emotional support;Ambulation/increased activity   Restrictions/Precautions   Weight Bearing Precautions Per Order No   Other Precautions Chair Alarm;Bed Alarm;Pain;Multiple lines   Home Living   Type of Home House   Home Layout Two level;Performs ADLs on one level;Able to live on main level with bedroom/bathroom  (3-4 MARVEL)   Bathroom Shower/Tub Tub/shower unit   Bathroom Toilet Standard   Bathroom Equipment Tub transfer bench;Commode   Home Equipment Cane   Additional Comments Pt has FFSU , SPC used pta   Prior Function   Level of [place] Independent with ADLs;Independent with functional mobility;Needs assistance with IADLS   Lives With Spouse;Family  (Spouse, step daughter , grandkids)   Receives Help From Family   IADLs Family/Friend/Other provides transportation;Family/Friend/Other provides meals;Family/Friend/Other provides medication management   Falls in the last 6 months 0   Vocational   (Light duty)   Cognition   Overall Cognitive Status WFL   Arousal/Participation Alert   Attention Within functional limits   Orientation Level Oriented X4   Following Commands  Follows all commands and directions without difficulty   RLE Assessment   RLE Assessment WFL   LLE Assessment   LLE Assessment   (L LE discomfort with movement, but WFL)   Light Touch   RLE Light Touch Grossly intact   LLE Light Touch Impaired   LLE Light Touch Comments Decreased compared to R LE   Bed Mobility   Supine to Sit 5  Supervision   Additional items Increased time required   Sit to Supine 5  Supervision   Additional items Increased time required   Additional Comments Pt in bed upon arrival.   Transfers   Sit to Stand 5  Supervision   Additional items Increased time required   Stand to Sit 5  Supervision   Additional items Increased time required   Additional Comments SPC   Ambulation/Elevation   Gait pattern Excessively slow;Decreased heel strike   Gait Assistance 5  Supervision   Assistive Device Straight cane   Distance 40 ft   Ambulation/Elevation Additional Comments Pt states ambulates as tolerated depending on back pain level, completes at this time with supervision   Balance   Static Sitting Good   Dynamic Sitting Fair +   Static Standing Fair   Dynamic Standing Fair   Ambulatory Fair   Activity Tolerance   Activity Tolerance Patient limited by pain   Medical Staff Made Aware OT Mumtaz   Nurse Made Aware RN cleared pt '   Assessment   Prognosis Fair   Problem List Decreased mobility;Decreased endurance;Pain   Assessment Pt is a 73 y.o. male seen for PT evaluation s/p admit to [facility] on 4/1/2024. Pt was admitted with a primary dx of: Shingles, Lumbar radiculopathy, HTN, Nausea, Acute renal failure.  PT now consulted for assessment of mobility and d/c needs. Pts current clinical presentation is Unstable/ Unpredictable (high complexity) due to Ongoing medical management for primary dx, Decreased activity tolerance compared to baseline. Prior to admission, pt was Independent for mobility . Upon evaluation, pt currently is requiring Supervision for all mobility aspects, limited gait 2* back  and HERBERTH Rosado. Pt presents at PT eval functioning below baseline and currently w/ overall mobility deficits 2* to: decreased endurance, gait deviations, pain, decreased activity tolerance compared to baseline.  Pt will continue to benefit from skilled acute PT interventions to maximize functional mobility; for ongoing pt training; and DME needs. At conclusion of PT session pt returned BTB, all needs in reach, and RN notified of session findings/recommendations with phone and call bell within reach. Pt denies any further questions at this time. The patient's AM-PAC Basic Mobility Inpatient Short Form Raw Score is 17. A Raw score of greater than 16 suggests the patient may benefit from discharge to home. Please also refer to the recommendation of the Physical Therapist for safe discharge planning. Recommend Level III upon hospital D/C. \   Goals   Patient Goals to go home   STG Expiration Date 04/16/24   Short Term Goal #1 STG 1. Pt will be able to perform bed mobility tasks with Ind in order to improve overall functional mobility and assist in safe d/c. STG 2. Pt with sit EOB for at least 25 minutes at Ind level in order to strengthen abdominal musculature and assist in future transfers/ ambulation. STG 3. Pt will be able to perform functional transfer with Mod I in order to improve overall functional mobility and assist in safe d/c. STG 4. Pt will be able to ambulate at least 150 feet with least restrictive device with Sup A in order to improve overall functional mobility and assist in safe d/c. STG 5. Pt will improve sitting/standing static/dynamic balance 1/2 grade in order to improve functional mobility and assist in safe d/c. STG 6. Pt will improve LE strength by 1/2 grade in order to improve functional mobility and assist in safe d/c. STG 7. Pt will be able to negotiate at least 4 stairs with least restrictive device with Sup A in order to improve overall functional mobility and assist in safe d/c.   PT  Treatment Day 0   Plan   Treatment/Interventions Elevations;Functional transfer training;OT;Gait training   PT Frequency 1-2x/wk   Discharge Recommendation   Rehab Resource Intensity Level, PT III (Minimum Resource Intensity)   Equipment Recommended Cane   AM-PAC Basic Mobility Inpatient   Turning in Flat Bed Without Bedrails 3   Lying on Back to Sitting on Edge of Flat Bed Without Bedrails 3   Moving Bed to Chair 3   Standing Up From Chair Using Arms 3   Walk in Room 3   Climb 3-5 Stairs With Railing 2   Basic Mobility Inpatient Raw Score 17   Basic Mobility Standardized Score 39.67   [facility] Highest Level Of Mobility   -[facility] Goal 5: Stand one or more mins   -HLM Achieved 7: Walk 25 feet or more   Modified Calvert Scale   Modified Tariq Scale 2   End of Consult   Patient Position at End of Consult All needs within reach;Supine       Jaja Rainey, PT DPT

## 2024-04-02 NOTE — UTILIZATION REVIEW
NOTIFICATION OF INPATIENT ADMISSION   AUTHORIZATION REQUEST   SERVICING FACILITY:   [facility]  Address: [address] [place] [address]  Tax ID: [id number]  NPI: [id number] ATTENDING PROVIDER:  Attending Name and NPI#: Krys Dougherty Md [[id number]]  Address: [address] [place] [address]  Phone: [phone]   ADMISSION INFORMATION:  Place of Service: Inpatient [facility]  Place of Service Code: 21  Inpatient Admission Date/Time: 4/1/24  2:08 PM  Discharge Date/Time: No discharge date for patient encounter.  Admitting Diagnosis Code/Description:  Shortness of breath [R06.02]  Shingles [B02.9]  Rash [R21]  HTN (hypertension) [I10]  DARIELA (acute kidney injury) (HCC) [N17.9]     UTILIZATION REVIEW CONTACT:  Matthias Menendez Utilization   Network Utilization Review Department  Phone: [phone]  Fax: [phone]  Email: [email or web address]  Contact for approvals/pending authorizations, clinical reviews, and discharge.     PHYSICIAN ADVISORY SERVICES:  Medical Necessity Denial & Avll-gj-Jgaj Review  Phone: [phone]  Fax: [phone]  Email: [email or web address]     DISCHARGE SUPPORT TEAM:  For Patients Discharge Needs & Updates  Phone: [phone] opt. 2 Fax: [phone]  Email: [email or web address]

## 2024-04-02 NOTE — PROGRESS NOTES
[facility]  Progress Note  Name: Bharat Ordoñez I  MRN: [id number]  Unit/Bed#: [facility]PHP 819-01 I Date of Admission: 4/1/2024   Date of Service: 4/2/2024 I Hospital Day: 1    Assessment/Plan   * Shingles  Assessment & Plan  Presented with new right lower back rash consistent with shingles  Possibly precipitated by a recent steroid course he used for his radiculopathy  Valtrex x 7 days  PRN analgesics    Acute renal failure (HCC)  Assessment & Plan  POA aeb creatinine of 1.67. limited prior lab records available for review, however in Care Everywhere it appears that baseline creatinine in 2002 was around 0.6-0.8  Creatinine has improved to 1.15  Continue IVF hydration  Continue to hold HCTZ  BMP in AM    Nausea  Assessment & Plan  Unclear etiology, now resolved  KUB was ordered on admission and results are pending    HTN (hypertension)  Assessment & Plan  Continue diltiazem 240mg daily  Holding hctz due to DARIELA    Lumbar radiculopathy  Assessment & Plan  Chronic right sided lumbar spine with radiculopathy  Follows with neurosurgery in [place] where he resides  Recent steroid taper was terminated early due to side affects  PRN analgesics for now.            VTE Pharmacologic Prophylaxis:    sc heparin    Mobility:   Basic Mobility Inpatient Raw Score: 17  JH-HLM Goal: 5: Stand one or more mins  JH-HLM Achieved: 6: Walk 10 steps or more  JH-HLM Goal achieved. Continue to encourage appropriate mobility.    Patient Centered Rounds: I performed bedside rounds with nursing staff today.   Discussions with Specialists or Other Care Team Provider:     Education and Discussions with Family / Patient: Updated  (wife) via phoneMildred Day    Total Time Spent on Date of Encounter in care of patient: 33 mins. This time was spent on one or more of the following: performing physical exam; counseling and coordination of care; obtaining or reviewing history; documenting in the medical  record; reviewing/ordering tests, medications or procedures; communicating with other healthcare professionals and discussing with patient's family/caregivers.    Current Length of Stay: 1 day(s)  Current Patient Status: Inpatient   Certification Statement: The patient will continue to require additional inpatient hospital stay due to DARIELA  Discharge Plan: Anticipate discharge tomorrow to home.    Code Status: Level 1 - Full Code    Subjective:   Mr. Ordoñez reports that his back feels much better. Also reports good PO intake. Reports nausea has resolved     Objective:     Vitals:   Temp (24hrs), Av.1 °F (36.7 °C), Min:97.9 °F (36.6 °C), Max:98.2 °F (36.8 °C)    Temp:  [97.9 °F (36.6 °C)-98.2 °F (36.8 °C)] 97.9 °F (36.6 °C)  HR:  [] 82  Resp:  [17-25] 17  BP: (128-175)/() 140/85  SpO2:  [94 %-98 %] 95 %  Body mass index is 33.45 kg/m².     Input and Output Summary (last 24 hours):   No intake or output data in the 24 hours ending 24 1205    Physical Exam:   Physical Exam  Vitals reviewed.   Constitutional:       General: He is not in acute distress.     Appearance: He is not ill-appearing.      Comments: Patient seen sitting in bed comfortably resting, NAD   Cardiovascular:      Rate and Rhythm: Normal rate and regular rhythm.   Pulmonary:      Effort: Pulmonary effort is normal. No respiratory distress.      Breath sounds: Normal breath sounds.   Abdominal:      General: Bowel sounds are normal.      Palpations: Abdomen is soft.      Tenderness: There is no abdominal tenderness.   Musculoskeletal:      Right lower leg: No edema.      Left lower leg: No edema.   Skin:     General: Skin is warm.      Findings: Rash (right sided back, dermatomal pattern) present.   Neurological:      Mental Status: He is alert and oriented to person, place, and time.   Psychiatric:         Mood and Affect: Mood normal.         Behavior: Behavior normal.          Additional Data:     Labs:  Results from last 7 days    Lab Units 04/02/24  0756   WBC Thousand/uL 8.27   HEMOGLOBIN g/dL 15.7   HEMATOCRIT % 46.6   PLATELETS Thousands/uL 123*   NEUTROS PCT % 74   LYMPHS PCT % 11*   MONOS PCT % 13*   EOS PCT % 1     Results from last 7 days   Lab Units 04/02/24  0756   SODIUM mmol/L 133*   POTASSIUM mmol/L 4.2   CHLORIDE mmol/L 101   CO2 mmol/L 25   BUN mg/dL 31*   CREATININE mg/dL 1.15   ANION GAP mmol/L 7   CALCIUM mg/dL 7.6*   ALBUMIN g/dL 2.8*   TOTAL BILIRUBIN mg/dL 0.57   ALK PHOS U/L 89   ALT U/L 32   AST U/L 18   GLUCOSE RANDOM mg/dL 183*     Results from last 7 days   Lab Units 04/01/24  1215   INR  1.11             Results from last 7 days   Lab Units 04/01/24  1458 04/01/24  1215   LACTIC ACID mmol/L 1.3 2.7*   PROCALCITONIN ng/ml  --  0.53*       Lines/Drains:  Invasive Devices       Peripheral Intravenous Line  Duration             Peripheral IV 04/01/24 Left Antecubital <1 day    Peripheral IV 04/01/24 Right Antecubital <1 day                      Telemetry:  Telemetry Orders (From admission, onward)               24 Hour Telemetry Monitoring  Continuous x 24 Hours (Telem)        Expiring   Question:  Reason for 24 Hour Telemetry  Answer:  Decompensated CHF- and any one of the following: continuous diuretic infusion or total diuretic dose >200 mg daily, associated electrolyte derangement (I.e. K < 3.0), ionotropic drip (continuous infusion), hx of ventricular arrhythmia, or new EF < 35%                     Telemetry Reviewed: Normal Sinus Rhythm and NSVT  Indication for Continued Telemetry Use: Arrthymias requiring medical therapy             Imaging: Reviewed radiology reports from this admission including: chest xray    Recent Cultures (last 7 days):   Results from last 7 days   Lab Units 04/01/24  1239 04/01/24  1215   BLOOD CULTURE  Received in Microbiology Lab. Culture in Progress. Received in Microbiology Lab. Culture in Progress.       Last 24 Hours Medication List:   Current Facility-Administered Medications    Medication Dose Route Frequency Provider Last Rate    acetaminophen  650 mg Oral Q6H PRN Marck Urena MD      diltiazem  240 mg Oral Daily Marck Urena MD      docusate sodium  100 mg Oral BID Marck Urena MD      heparin (porcine)  5,000 Units Subcutaneous Q8H RAUDEL Marck Urena MD      multi-electrolyte  100 mL/hr Intravenous Continuous Jesús Bowen PA-C 100 mL/hr (04/02/24 0837)    ondansetron  4 mg Intravenous Q6H PRN Marck Urena MD      oxyCODONE  5 mg Oral Q4H PRN Marck Urena MD      Or    oxyCODONE  10 mg Oral Q4H PRN Marck Urena MD      senna  2 tablet Oral Daily Marck Urena MD      valACYclovir  1,000 mg Oral Q12H Marck Urena MD          Today, Patient Was Seen By: Jesús Bowen PA-C    **Please Note: This note may have been constructed using a voice recognition system.**

## 2024-04-02 NOTE — ASSESSMENT & PLAN NOTE
POA aeb creatinine of 1.67. limited prior lab records available for review, however in Care Everywhere it appears that baseline creatinine in 2002 was around 0.6-0.8  Creatinine has improved to 1.15  Continue IVF hydration  Continue to hold HCTZ  BMP in AM

## 2024-04-02 NOTE — ASSESSMENT & PLAN NOTE
Presented with new right lower back rash consistent with shingles  Possibly precipitated by a recent steroid course he used for his radiculopathy  Valtrex x 7 days  PRN analgesics

## 2024-04-02 NOTE — OCCUPATIONAL THERAPY NOTE
Occupational Therapy Evaluation     Patient Name: Bharat Ordoñez  Today's Date: 4/2/2024  Problem List  Principal Problem:    Shingles  Active Problems:    Acute renal failure (HCC)    Nausea    HTN (hypertension)    Lumbar radiculopathy    Past Medical History  History reviewed. No pertinent past medical history.  Past Surgical History  History reviewed. No pertinent surgical history.        04/02/24 1225   OT Last Visit   OT Visit Date 04/02/24   Note Type   Note type Evaluation   Pain Assessment   Pain Assessment Tool 0-10   Pain Score 7   Pain Location/Orientation Orientation: Lower;Location: Back   Pain Onset/Description Onset: Ongoing   Patient's Stated Pain Goal No pain   Hospital Pain Intervention(s) Repositioned;Ambulation/increased activity;Emotional support   Restrictions/Precautions   Weight Bearing Precautions Per Order No   Other Precautions Chair Alarm;Bed Alarm;Multiple lines;Fall Risk;Pain   Home Living   Type of Home House   Home Layout Two level;Performs ADLs on one level;Able to live on main level with bedroom/bathroom;Stairs to enter with rails  (3-4STE)   Bathroom Shower/Tub Tub/shower unit   Bathroom Toilet Standard   Bathroom Equipment Tub transfer bench;Commode   Home Equipment Cane   Additional Comments 2SH, FFSU, SPC used PTA   Prior Function   Level of [place] Independent with ADLs;Independent with functional mobility;Needs assistance with IADLS   Lives With Spouse;Family  (spouse + step dtr + grandkids)   Receives Help From Family   IADLs Family/Friend/Other provides meals;Family/Friend/Other provides medication management;Independent with driving   Falls in the last 6 months 0   Vocational On disability  (part time lower level work)   Lifestyle   Autonomy IND with ADLs, Assist IADLs. +. SPC used PTA   Reciprocal Relationships Lives with supportive spouse, step daughter and grandkids   Service to Others On disability, working lower level non-lifting jobs to keep [employer]  "  Intrinsic Gratification Driving   General   Family/Caregiver Present No   Additional General Comments Pt reports getting OP PT 2/2 back pain from workers comp injury   Subjective   Subjective \"I want to get out of here   ADL   Where Assessed Edge of bed   Eating Assistance 5  Supervision/Setup   Grooming Assistance 5  Supervision/Setup   UB Bathing Assistance 5  Supervision/Setup   LB Bathing Assistance 4  Minimal Assistance   UB Dressing Assistance 5  Supervision/Setup   LB Dressing Assistance 4  Minimal Assistance   Toileting Assistance  5  Supervision/Setup   Functional Assistance 5  Supervision/Setup   Bed Mobility   Supine to Sit 5  Supervision   Additional items Increased time required   Sit to Supine 5  Supervision   Additional items Increased time required   Additional Comments Pt supine in bed pre/post session, all needs met, call bell within reach   Transfers   Sit to Stand 5  Supervision   Additional items Increased time required   Stand to Sit 5  Supervision   Additional items Increased time required   Additional Comments SPC in stance   Functional Mobility   Functional Mobility 5  Supervision   Additional Comments SPC, household distances, increased time   Additional items SPC   Balance   Static Sitting Fair +   Dynamic Sitting Fair +   Static Standing Fair   Dynamic Standing Fair -   Ambulatory Fair -   Activity Tolerance   Activity Tolerance Patient limited by fatigue;Patient limited by pain   Medical Staff Made Aware PT Jaja, co-sarah 2/2 increased medical complexity and comorbidities   Nurse Made Aware RN cleared for therapy   RUE Assessment   RUE Assessment WFL   LUE Assessment   LUE Assessment WFL   Hand Function   Gross Motor Coordination Functional   Fine Motor Coordination Functional   Cognition   Overall Cognitive Status WFL   Arousal/Participation Alert;Cooperative   Attention Within functional limits   Orientation Level Oriented X4   Memory Decreased recall of precautions   Following " Commands Follows all commands and directions without difficulty   Comments Pt pleasant and cooperative, motivated to participate.   Assessment   Limitation Decreased ADL status;Decreased Safe judgement during ADL;Decreased endurance;Decreased self-care trans;Decreased high-level ADLs   Prognosis Good   Assessment 73 year old pt seen today for an OT evaluation following admission to [facility] 2/2 Shingles, back pain with present symptoms significant for fatigue, weakness, decreased functional mobility, decreased ADL status, pain. Pt has no past medical history on file. But reports recent work injury causing back pain. Pt reported that he lives with his supportive wife, daughter, and grandkids in a 2SH, FFSU available with 3-4STE. Pt reports being IND with all ADLs, has assist for IADLs. +. Pt very pleasant and cooperative t/o session. Pt completed functional bed mobility with SUP. SUP  for functional STS txfs with SPC. SUP for functional mobility to go short household distances in room with SPC. Pt was SUP for UB ADLs and Min A for LB ADLs. The patient's raw score on the AM-PAC Daily Activity Inpatient Short Form is 19. A raw score of greater than or equal to 19 suggests the patient may benefit from discharge to home. Please refer to the recommendation of the Occupational Therapist for safe discharge planning. Pt is functioning below baseline level of function and will continue to benefit from skilled acute OT to promote increased independence and return to PLOF. At this time, current OT recommendation is Level 3 resources upon d/c.   Goals   Patient Goals to go home   LTG Time Frame 10-14   Long Term Goal #1 See below for goals   Plan   Treatment Interventions ADL retraining;Functional transfer training;Endurance training;Patient/family training;Equipment evaluation/education;Fine motor coordination activities;Compensatory technique education;Continued evaluation;Energy  conservation;Activityengagement   Goal Expiration Date 04/16/24   OT Frequency 1-2x/wk   Discharge Recommendation   Rehab Resource Intensity Level, OT III (Minimum Resource Intensity)   AM-PAC Daily Activity Inpatient   Lower Body Dressing 3   Bathing 3   Toileting 3   Upper Body Dressing 3   Grooming 3   Eating 4   Daily Activity Raw Score 19   Daily Activity Standardized Score (Calc for Raw Score >=11) 40.22   AM-PAC Applied Cognition Inpatient   Following a Speech/Presentation 3   Understanding Ordinary Conversation 4   Taking Medications 4   Remembering Where Things Are Placed or Put Away 4   Remembering List of 4-5 Errands 4   Taking Care of Complicated Tasks 3   Applied Cognition Raw Score 22   Applied Cognition Standardized Score 47.83   End of Consult   Education Provided Yes   Patient Position at End of Consult Supine;Bed/Chair alarm activated;All needs within reach   Nurse Communication Nurse aware of consult       Occupational Therapy Goals    Pt will be Mod I with bed mobility with good sitting balance/tolerance to engage in self care tasks within this plan of care.      Pt will be Mod I for UB dressing and bathing with use of assistive devices PRN within this plan of care.     Pt will be Mod I for LB dressing and bathing with use of assistive devices PRN within this plan of care.     Pt will demonstrate standing tolerance of 2-3 minutes increase independence for toileting ADLs/tasks with use of assistive devices PRN within this plan of care.     Pt will demonstrate good carryover of safety awareness with use of RW during functional tasks within this plan of care.     Pt will demonstrated increased activity tolerance to 30 mins for participation in ADLs and functional tasks within this plan of care.     Pt will complete functional cognitive assessment with good attention/participation to assist with safe d/c planning recommendations.        Mumtaz Nayak MS, OTR/L

## 2024-04-02 NOTE — PLAN OF CARE
Problem: PHYSICAL THERAPY ADULT  Goal: Performs mobility at highest level of function for planned discharge setting.  See evaluation for individualized goals.  Description: Treatment/Interventions: Elevations, Functional transfer training, OT, Gait training  Equipment Recommended: Cane       See flowsheet documentation for full assessment, interventions and recommendations.  Note: Prognosis: Fair  Problem List: Decreased mobility, Decreased endurance, Pain  Assessment: Pt is a 73 y.o. male seen for PT evaluation s/p admit to [facility] on 4/1/2024. Pt was admitted with a primary dx of: Shingles, Lumbar radiculopathy, HTN, Nausea, Acute renal failure.  PT now consulted for assessment of mobility and d/c needs. Pts current clinical presentation is Unstable/ Unpredictable (high complexity) due to Ongoing medical management for primary dx, Decreased activity tolerance compared to baseline. Prior to admission, pt was Independent for mobility . Upon evaluation, pt currently is requiring Supervision for all mobility aspects, limited gait 2* back and L L Epain. Pt presents at PT eval functioning below baseline and currently w/ overall mobility deficits 2* to: decreased endurance, gait deviations, pain, decreased activity tolerance compared to baseline.  Pt will continue to benefit from skilled acute PT interventions to maximize functional mobility; for ongoing pt training; and DME needs. At conclusion of PT session pt returned BTB, all needs in reach, and RN notified of session findings/recommendations with phone and call bell within reach. Pt denies any further questions at this time. The patient's AM-PAC Basic Mobility Inpatient Short Form Raw Score is 17. A Raw score of greater than 16 suggests the patient may benefit from discharge to home. Please also refer to the recommendation of the Physical Therapist for safe discharge planning. Recommend Level III upon hospital D/C. \        Rehab Resource Intensity  Level, PT: III (Minimum Resource Intensity)    See flowsheet documentation for full assessment.

## 2024-04-02 NOTE — CASE MANAGEMENT
Case Management Assessment & Discharge Planning Note    Patient name Bharat Ordoñez  Location [facility] 819/[facility] 819-01 MRN [id number]  : 1950 Date 2024       Current Admission Date: 2024  Current Admission Diagnosis:Shingles   Patient Active Problem List    Diagnosis Date Noted    Shingles 2024    Acute renal failure (HCC) 2024    Nausea 2024    HTN (hypertension) 2024    Lumbar radiculopathy 2024      LOS (days): 1  Geometric Mean LOS (GMLOS) (days): 2.2  Days to GMLOS:1.4     OBJECTIVE:    Risk of Unplanned Readmission Score: 10.23         Current admission status: Inpatient       Preferred Pharmacy:   [id number] - [place], [place] - [address]  [address]  [place] [address]  Phone: [phone] Fax: [phone]    Primary Care Provider: No primary care provider on file.    Primary Insurance: [employer]  Secondary Insurance: [place] MEDICAID    ASSESSMENT:  Active Health Care Proxies    There are no active Health Care Proxies on file.                 Readmission Root Cause  30 Day Readmission: No    Patient Information  Admitted from:: Home  Mental Status: Alert  Assessment information provided by:: Spouse  Primary Caregiver: Self  Support Systems: Self, Spouse/significant other  County of Residence: Other (specify in comment box[place] ([place])  What city do you live in?: [place], [place]  Type of Current Residence: 2 [address] home  Upon entering residence, is there a bedroom on the main floor (no further steps)?: Yes  Upon entering residence, is there a bathroom on the main floor (no further steps)?: Yes  Living Arrangements: Lives w/ Spouse/significant other, Lives w/ Daughter  Is patient a ?: No    Activities of Daily Living Prior to Admission  Functional Status: Independent  Completes ADLs independently?: Yes  Ambulates independently?: Yes  Does patient use assisted devices?: Yes  Assisted Devices (DME) used: Straight Cane  Does  patient currently own DME?: Yes  What DME does the patient currently own?: Ela Cane  Does patient have a history of Outpatient Therapy (PT/OT)?: Yes  Does the patient have a history of Short-Term Rehab?: No  Does patient have a history of HHC?: No  Does patient currently have HHC?: No         Patient Information Continued  Income Source: Pension/snf  Does patient have prescription coverage?: Yes  Does patient receive dialysis treatments?: No  Does patient have a history of substance abuse?: No  Does patient have a history of Mental Health Diagnosis?: No         Means of Transportation  Means of Transport to Appts:: Family transport      Social Determinants of Health (SDOH)      Flowsheet Row Most Recent Value   Housing Stability    In the last 12 months, was there a time when you were not able to pay the mortgage or rent on time? N   In the last 12 months, how many places have you lived? 1   In the last 12 months, was there a time when you did not have a steady place to sleep or slept in a shelter (including now)? N   Transportation Needs    In the past 12 months, has lack of transportation kept you from medical appointments or from getting medications? no   In the past 12 months, has lack of transportation kept you from meetings, work, or from getting things needed for daily living? No   Food Insecurity    Within the past 12 months, you worried that your food would run out before you got the money to buy more. Never true   Within the past 12 months, the food you bought just didn't last and you didn't have money to get more. Never true   Utilities    In the past 12 months has the electric, gas, oil, or water company threatened to shut off services in your home? No            DISCHARGE DETAILS:    Discharge planning discussed with:: Barb Ordoñez (Spouse)  [phone]  Freedom of Choice: Yes     CM contacted family/caregiver?: Yes (Barb Ordoñez (Spouse)  [phone])  Were Treatment Team discharge  recommendations reviewed with patient/caregiver?: Yes  Did patient/caregiver verbalize understanding of patient care needs?: Yes  Were patient/caregiver advised of the risks associated with not following Treatment Team discharge recommendations?: Yes    Contacts  Patient Contacts: Barb Ordoñez (Spouse)  [phone]  Relationship to Patient:: Family  Contact Method: Phone  Phone Number: [phone]  Reason/Outcome: Continuity of Care, Emergency Contact, Discharge Planning      Would you like to participate in our [facility] service program?  : No - Declined       Additional Comments: CM called pt's wife   Barb Ordoñez (Spouse)  [phone]   to introduce role and complete assessment. Pt lives w/ wife and daughter who is able to assist if needed. Pt's wife reported he is independent and completes ADLs/ambulatory needs with cane at baseline. Pt's wife has no HX of D&A or MH DX. Pt's wife reported HX of OP pt/ot. Pt's wife said pt has no HX of HHC. CM to follow.     CM reviewed d/c planning process including the following: identifying help at home, patient preference for d/c planning needs, Discharge Lounge, [facility] to Bed program, availability of treatment team to discuss questions or concerns patient and/or family may have regarding understanding medications and recognizing signs and symptoms once discharged.  CM also encouraged patient to follow up with all recommended appointments after discharge. Patient advised of importance for patient and family to participate in managing patient’s medical well being.

## 2024-04-02 NOTE — PLAN OF CARE
Problem: PAIN - ADULT  Goal: Verbalizes/displays adequate comfort level or baseline comfort level  Description: Interventions:  - Encourage patient to monitor pain and request assistance  - Assess pain using appropriate pain scale  - Administer analgesics based on type and severity of pain and evaluate response  - Implement non-pharmacological measures as appropriate and evaluate response  - Consider cultural and social influences on pain and pain management  - Notify physician/advanced practitioner if interventions unsuccessful or patient reports new pain  Outcome: Progressing     Problem: INFECTION - ADULT  Goal: Absence or prevention of progression during hospitalization  Description: INTERVENTIONS:  - Assess and monitor for signs and symptoms of infection  - Monitor lab/diagnostic results  - Monitor all insertion sites, i.e. indwelling lines, tubes, and drains  - Monitor endotracheal if appropriate and nasal secretions for changes in amount and color  - [place] appropriate cooling/warming therapies per order  - Administer medications as ordered  - Instruct and encourage patient and family to use good hand hygiene technique  - Identify and instruct in appropriate isolation precautions for identified infection/condition  Outcome: Progressing  Goal: Absence of fever/infection during neutropenic period  Description: INTERVENTIONS:  - Monitor WBC    Outcome: Progressing     Problem: SAFETY ADULT  Goal: Patient will remain free of falls  Description: INTERVENTIONS:  - Educate patient/family on patient safety including physical limitations  - Instruct patient to call for assistance with activity   - Consult OT/PT to assist with strengthening/mobility   - Keep Call bell within reach  - Keep bed low and locked with side rails adjusted as appropriate  - Keep care items and personal belongings within reach  - Initiate and maintain comfort rounds  - Make Fall Risk Sign visible to staff  - Offer Toileting every 4 Hours,  in advance of need  - Initiate/Maintain bed alarm  - Obtain necessary fall risk management equipment:   - Apply yellow socks and bracelet for high fall risk patients  - Consider moving patient to room near nurses station  Outcome: Progressing  Goal: Maintain or return to baseline ADL function  Description: INTERVENTIONS:  -  Assess patient's ability to carry out ADLs; assess patient's baseline for ADL function and identify physical deficits which impact ability to perform ADLs (bathing, care of mouth/teeth, toileting, grooming, dressing, etc.)  - Assess/evaluate cause of self-care deficits   - Assess range of motion  - Assess patient's mobility; develop plan if impaired  - Assess patient's need for assistive devices and provide as appropriate  - Encourage maximum independence but intervene and supervise when necessary  - Involve family in performance of ADLs  - Assess for home care needs following discharge   - Consider OT consult to assist with ADL evaluation and planning for discharge  - Provide patient education as appropriate  Outcome: Progressing  Goal: Maintains/Returns to pre admission functional level  Description: INTERVENTIONS:  - Perform AM-PAC 6 Click Basic Mobility/ Daily Activity assessment daily.  - Set and communicate daily mobility goal to care team and patient/family/caregiver.   - Collaborate with rehabilitation services on mobility goals if consulted  - Perform Range of Motion 3 times a day.  - Reposition patient every 3 hours.  - Dangle patient 3 times a day  - Stand patient 3 times a day  - Ambulate patient 3 times a day  - Out of bed to chair 3 times a day   - Out of bed for meals 3 times a day  - Out of bed for toileting  - Record patient progress and toleration of activity level   Outcome: Progressing     Problem: DISCHARGE PLANNING  Goal: Discharge to home or other facility with appropriate resources  Description: INTERVENTIONS:  - Identify barriers to discharge w/patient and caregiver  -  Arrange for needed discharge resources and transportation as appropriate  - Identify discharge learning needs (meds, wound care, etc.)  - Arrange for interpretive services to assist at discharge as needed  - Refer to Case Management Department for coordinating discharge planning if the patient needs post-hospital services based on physician/advanced practitioner order or complex needs related to functional status, cognitive ability, or social support system  Outcome: Progressing     Problem: Knowledge Deficit  Goal: Patient/family/caregiver demonstrates understanding of disease process, treatment plan, medications, and discharge instructions  Description: Complete learning assessment and assess knowledge base.  Interventions:  - Provide teaching at level of understanding  - Provide teaching via preferred learning methods  Outcome: Progressing

## 2024-04-02 NOTE — UTILIZATION REVIEW
"Initial Clinical Review    Admission: Date/Time/Statement:   Admission Orders (From admission, onward)       Ordered        04/01/24 1513  INPATIENT ADMISSION  Once                          Orders Placed This Encounter   Procedures    INPATIENT ADMISSION     Standing Status:   Standing     Number of Occurrences:   1     Order Specific Question:   Level of Care     Answer:   Med Surg [16]     Order Specific Question:   Estimated length of stay     Answer:   More than 2 Midnights     Order Specific Question:   Certification     Answer:   I certify that inpatient services are medically necessary for this patient for a duration of greater than two midnights. See H&P and MD Progress Notes for additional information about the patient's course of treatment.     ED Arrival Information       Expected   -    Arrival   4/1/2024 11:43    Acuity   Emergent              Means of arrival   Walk-In    Escorted by   Self    Service   Hospitalist    Admission type   Emergency              Arrival complaint   Shingles             Chief Complaint   Patient presents with    Rash     Patient has a rash on his R hip that is red and in small raised clusters. Patient also has not slept in 4 days because of the back pain.     Shortness of Breath     Patient experiencing SOB during triage process. Brought back to room right away and placed on 4 L NC -CP       Initial Presentation: 73 y.o. male with PMHx: lumbar radiculopathy who presented to [facility] ED due to back pain. He is visiting from [place] here \"for workmans comp\" and was participating in physical therapy. He reports worsening lower back pain and was noted by a nurse there to have a rash of the lower back. Due to suspicion of shingles he was referred to the ED. He also reports chronic pain, nausea, poor appetite.  On exam, rash noted on right lower back. Also RA sat 84% and pt tachypneic.  Labs revealed wbc 11.39, hgb 18.9, hct 57.2, Na 133, Chl 93, BUN 29, Cr 1.67, alk phos " 127, lactic acid 2.7, procal 0.53. Given 1L IVF bolus followed by infusion, valtrex and dilaudid in ED.  Plan:  Admit Inpatient status to med surg dt Shingles, Acute renal failure: continue valrex, pain control, check bladder scan, UA and feNA. Abdomen is soft but distended, last BM on 3/31, check stool burden on KUB, start bowel regimen, zofran prn, diet as tolerated, consider CT scan if unresolved. Monitor BP and hold HCTZ dt DARIELA. PT OT eval, CM following. SCDs.     Date: 4/1   Day 2: Feeling better today, nausea resolved. Rash continues on right side of back. Continue above tx plan including valtrex for 7 days, pain control, IVF, hold HCTZ, BMP in AM, monitor BP.     Day 3: Has surpassed a 2nd midnight with active treatments and services. Renal function has improved today. Rash continues. Continue valtrex, pain control, fu on am labs.     ED Triage Vitals [04/01/24 1152]   Temperature Pulse Respirations Blood Pressure SpO2   (!) 97.2 °F (36.2 °C) 96 (!) 28 142/93 (!) 84 %      Temp Source Heart Rate Source Patient Position - Orthostatic VS BP Location FiO2 (%)   Temporal Monitor Sitting Left arm --      Pain Score       10 - Worst Possible Pain          Wt Readings from Last 1 Encounters:   04/01/24 99.8 kg (220 lb)     Additional Vital Signs:   Date/Time Temp Pulse Resp BP MAP (mmHg) SpO2 O2 Device Patient Position - Orthostatic VS   04/03/24 08:02:11 98.2 °F (36.8 °C) 74 20 143/71 95 95 % -- --   04/03/24 0757 -- -- -- -- -- -- None (Room air) --   04/03/24 0356 -- -- 20 -- -- -- -- --   04/02/24 21:59:58 97.6 °F (36.4 °C) 83 20 161/71 101 91 % -- --   04/02/24 2030 -- -- -- -- -- -- None (Room air) --   04/02/24 19:21:02 98.7 °F (37.1 °C) 87 18 146/82 103 94 % -- --   04/02/24 15:41:13 99.6 °F (37.6 °C) 84 18 148/83 105 95 % -- --   04/02/24 07:45:41 97.9 °F (36.6 °C) 82 17 140/85 103 95 % -- --   04/02/24 0735 -- -- -- -- -- -- None (Room air) --   04/01/24 2555 -- -- -- -- -- -- None (Room air) --   04/01/24  22:56:15 98.2 °F (36.8 °C) 94 20 136/90 105 96 % -- --   04/01/24 21:05:25 98.2 °F (36.8 °C) 101 18 139/95 110 96 % -- --   04/01/24 2030 -- 99 21 141/78 102 94 % None (Room air) Lying   04/01/24 2000 -- 101 18 128/75 94 94 % None (Room air) Lying   04/01/24 1930 -- 99 20 142/81 107 95 % None (Room air) Lying   04/01/24 1830 -- 101 19 145/73 101 95 % None (Room air) Lying   04/01/24 1730 -- 102 22 170/154 Abnormal  159 95 % None (Room air) Lying   04/01/24 1700 -- 92 20 148/120 Abnormal  131 95 % None (Room air) Lying   04/01/24 1600 -- 94 25 Abnormal  175/109 Abnormal  134 96 % None (Room air) Lying   04/01/24 1456 -- 100 22 146/86 111 95 % None (Room air) Lying   04/01/24 1224 -- 97 22 -- -- 98 % None (Room air) --   04/01/24 1200 -- 107 Abnormal  24 Abnormal  200/108 Abnormal  138 94 % None (Room air) Lying   04/01/24 1152 97.2 °F (36.2 °C) Abnormal  96 28 Abnormal  142/93 -- 84 % Abnormal  None (Room air) Sitting     Pertinent Labs/Diagnostic Test Results:   4/1 EKG: NSR    XR abdomen 1 view kub   Final Result by Serafin Graham MD (04/02 1608)      Unremarkable abdomen.               Workstation performed: [id number]         XR chest 1 view portable   ED Interpretation by Annette Maria Palladino, DO (04/01 1328)   No acute obvious abnormalities noted.      Final Result by Kristian Huang MD (04/01 1556)      No acute cardiopulmonary disease.            Workstation performed: [id number]           Results from last 7 days   Lab Units 04/01/24  1215   SARS-COV-2  Negative     Results from last 7 days   Lab Units 04/03/24  0559 04/02/24  0756 04/01/24  1632 04/01/24  1215   WBC Thousand/uL 8.54 8.27  --  11.39*   HEMOGLOBIN g/dL 14.1 15.7  --  18.9*   HEMATOCRIT % 43.5 46.6  --  57.2*   PLATELETS Thousands/uL 120* 123* 105* 169   NEUTROS ABS Thousands/µL  --  6.20  --  9.24*         Results from last 7 days   Lab Units 04/03/24  0559 04/02/24  0756 04/01/24  1215   SODIUM mmol/L 133* 133* 133*   POTASSIUM mmol/L  4.6 4.2 4.4   CHLORIDE mmol/L 101 101 93*   CO2 mmol/L 23 25 27   ANION GAP mmol/L 9 7 13   BUN mg/dL 21 31* 29*   CREATININE mg/dL 1.01 1.15 1.67*   EGFR ml/min/1.73sq m 74 63 40   CALCIUM mg/dL 7.0* 7.6* 8.7   MAGNESIUM mg/dL  --  2.0  --    PHOSPHORUS mg/dL  --  2.6  --      Results from last 7 days   Lab Units 04/02/24  0756 04/01/24  1215   AST U/L 18 22   ALT U/L 32 39   ALK PHOS U/L 89 127*   TOTAL PROTEIN g/dL 5.4* 7.5   ALBUMIN g/dL 2.8* 3.7   TOTAL BILIRUBIN mg/dL 0.57 1.04*         Results from last 7 days   Lab Units 04/03/24  0559 04/02/24  0756 04/01/24  1215   GLUCOSE RANDOM mg/dL 164* 183* 184*       Results from last 7 days   Lab Units 04/01/24  1632 04/01/24  1458 04/01/24  1215   HS TNI 0HR ng/L  --   --  7   HS TNI 2HR ng/L  --  6  --    HSTNI D2 ng/L  --  -1  --    HS TNI 4HR ng/L 8  --   --    HSTNI D4 ng/L 1  --   --          Results from last 7 days   Lab Units 04/01/24  1215   PROTIME seconds 14.2   INR  1.11   PTT seconds 24     Results from last 7 days   Lab Units 04/01/24  1215   TSH 3RD GENERATON uIU/mL 2.630     Results from last 7 days   Lab Units 04/01/24  1215   PROCALCITONIN ng/ml 0.53*     Results from last 7 days   Lab Units 04/01/24  1458 04/01/24  1215   LACTIC ACID mmol/L 1.3 2.7*       Results from last 7 days   Lab Units 04/01/24  1215   BNP pg/mL 12       Results from last 7 days   Lab Units 04/01/24  1848   CLARITY UA  Extra Turbid   COLOR UA  Yellow   SPEC GRAV UA  1.022   PH UA  5.0   GLUCOSE UA mg/dl Negative   KETONES UA mg/dl Negative   BLOOD UA  Negative   PROTEIN UA mg/dl Trace*   NITRITE UA  Negative   BILIRUBIN UA  Negative   UROBILINOGEN UA (BE) mg/dl <2.0   LEUKOCYTES UA  Negative   WBC UA /hpf 4-10*   RBC UA /hpf None Seen   BACTERIA UA /hpf Occasional   EPITHELIAL CELLS WET PREP /hpf Occasional   MUCUS THREADS  Innumerable*     Results from last 7 days   Lab Units 04/01/24  1215   INFLUENZA A PCR  Negative   INFLUENZA B PCR  Negative   RSV PCR  Negative        Results from last 7 days   Lab Units 04/01/24  1239 04/01/24  1215   BLOOD CULTURE  No Growth at 24 hrs. No Growth at 24 hrs.       ED Treatment:   Medication Administration from 04/01/2024 1143 to 04/01/2024 2049         Date/Time Order Dose Route Action     04/01/2024 1600 EDT valACYclovir (VALTREX) tablet 1,000 mg 1,000 mg Oral Given     04/01/2024 1237 EDT predniSONE tablet 40 mg 40 mg Oral Given     04/01/2024 1453 EDT HYDROmorphone (DILAUDID) injection 0.5 mg 0.5 mg Intravenous Given     04/01/2024 1454 EDT multi-electrolyte (ISOLYTE-S PH 7.4) bolus 1,000 mL 1,000 mL Intravenous New Bag     04/01/2024 1602 EDT enoxaparin (LOVENOX) subcutaneous injection 40 mg 40 mg Subcutaneous Not Given     04/01/2024 1844 EDT multi-electrolyte (PLASMALYTE-A/ISOLYTE-S PH 7.4) IV solution 100 mL/hr Intravenous New Bag          History reviewed. No pertinent past medical history.  Present on Admission:  **None**      Admitting Diagnosis: Shortness of breath [R06.02]  Shingles [B02.9]  Rash [R21]  HTN (hypertension) [I10]  DARIELA (acute kidney injury) (HCC) [N17.9]  Age/Sex: 73 y.o. male  Admission Orders:  Scheduled Medications:  diltiazem, 240 mg, Oral, Daily  docusate sodium, 100 mg, Oral, BID  heparin (porcine), 5,000 Units, Subcutaneous, Q8H RAUDEL  senna, 2 tablet, Oral, Daily  valACYclovir, 1,000 mg, Oral, Q12H      Continuous IV Infusions:  multi-electrolyte, 100 mL/hr, Intravenous, Continuous      PRN Meds:  acetaminophen, 650 mg, Oral, Q6H PRN  ondansetron, 4 mg, Intravenous, Q6H PRN  oxyCODONE, 5 mg, Oral, Q4H PRN x2 thus far   Or  oxyCODONE, 10 mg, Oral, Q4H PRN        IP CONSULT TO CASE MANAGEMENT    Network Utilization Review Department  ATTENTION: Please call with any questions or concerns to [phone] and carefully listen to the prompts so that you are directed to the right person. All voicemails are confidential.   For Discharge needs, contact Care Management DC Support Team at [phone] opt. [phone]  Send all  requests for admission clinical reviews, approved or denied determinations and any other requests to dedicated fax number below belonging to the campus where the patient is receiving treatment. List of dedicated fax numbers for the Facilities:  FACILITY NAME UR FAX NUMBER   ADMISSION DENIALS (Administrative/Medical Necessity) [phone]   DISCHARGE SUPPORT TEAM (NETWORK) [phone]   PARENT CHILD HEALTH (Maternity/NICU/Pediatrics) [phone]   [facility] [phone]   [facility] [phone]   [facility] [phone]   [facility] [phone]   [facility] [phone]   [facility] [phone]   [facility] [phone]   [facility] [phone]   [facility] [phone]   [facility] [phone]   [facility] [phone]   [facility] [phone]

## 2024-04-02 NOTE — UTILIZATION REVIEW
NOTIFICATION OF INPATIENT ADMISSION   AUTHORIZATION REQUEST   SERVICING FACILITY:   [facility]  Address: [address] [place] [address]  Tax ID: [id number]  NPI: [id number] ATTENDING PROVIDER:  Attending Name and NPI#: Krys Dougherty Md [[id number]]  Address: [address] [place] [address]  Phone: [phone]   ADMISSION INFORMATION:  Place of Service: Inpatient [facility]  Place of Service Code: 21  Inpatient Admission Date/Time: 4/1/24  2:08 PM  Discharge Date/Time: No discharge date for patient encounter.  Admitting Diagnosis Code/Description:  Shortness of breath [R06.02]  Shingles [B02.9]  Rash [R21]  HTN (hypertension) [I10]  DARIELA (acute kidney injury) (HCC) [N17.9]     UTILIZATION REVIEW CONTACT:  Matthias Menendez Utilization   Network Utilization Review Department  Phone: [phone]  Fax: [phone]  Email: [email or web address]  Contact for approvals/pending authorizations, clinical reviews, and discharge.     PHYSICIAN ADVISORY SERVICES:  Medical Necessity Denial & Lfcv-os-[facility] Review  Phone: [phone]  Fax: [phone]  Email: [email or web address]     DISCHARGE SUPPORT TEAM:  For Patients Discharge Needs & Updates  Phone: [phone] opt. 2 Fax: [phone]  Email: [email or web address]

## 2024-04-02 NOTE — PLAN OF CARE
Problem: PAIN - ADULT  Goal: Verbalizes/displays adequate comfort level or baseline comfort level  Description: Interventions:  - Encourage patient to monitor pain and request assistance  - Assess pain using appropriate pain scale  - Administer analgesics based on type and severity of pain and evaluate response  - Implement non-pharmacological measures as appropriate and evaluate response  - Consider cultural and social influences on pain and pain management  - Notify physician/advanced practitioner if interventions unsuccessful or patient reports new pain  Outcome: Progressing     Problem: INFECTION - ADULT  Goal: Absence or prevention of progression during hospitalization  Description: INTERVENTIONS:  - Assess and monitor for signs and symptoms of infection  - Monitor lab/diagnostic results  - Monitor all insertion sites, i.e. indwelling lines, tubes, and drains  - Monitor endotracheal if appropriate and nasal secretions for changes in amount and color  - [place] appropriate cooling/warming therapies per order  - Administer medications as ordered  - Instruct and encourage patient and family to use good hand hygiene technique  - Identify and instruct in appropriate isolation precautions for identified infection/condition  Outcome: Progressing     Problem: SAFETY ADULT  Goal: Patient will remain free of falls  Description: INTERVENTIONS:  - Educate patient/family on patient safety including physical limitations  - Instruct patient to call for assistance with activity   - Consult OT/PT to assist with strengthening/mobility   - Keep Call bell within reach  - Keep bed low and locked with side rails adjusted as appropriate  - Keep care items and personal belongings within reach  - Initiate and maintain comfort rounds  - Make Fall Risk Sign visible to staff  - Apply yellow socks and bracelet for high fall risk patients  - Consider moving patient to room near nurses station  Outcome: Progressing  Goal: Maintain or  return to baseline ADL function  Description: INTERVENTIONS:  -  Assess patient's ability to carry out ADLs; assess patient's baseline for ADL function and identify physical deficits which impact ability to perform ADLs (bathing, care of mouth/teeth, toileting, grooming, dressing, etc.)  - Assess/evaluate cause of self-care deficits   - Assess range of motion  - Assess patient's mobility; develop plan if impaired  - Assess patient's need for assistive devices and provide as appropriate  - Encourage maximum independence but intervene and supervise when necessary  - Involve family in performance of ADLs  - Assess for home care needs following discharge   - Consider OT consult to assist with ADL evaluation and planning for discharge  - Provide patient education as appropriate  Outcome: Progressing  Goal: Maintains/Returns to pre admission functional level  Description: INTERVENTIONS:  - Perform AM-PAC 6 Click Basic Mobility/ Daily Activity assessment daily.  - Set and communicate daily mobility goal to care team and patient/family/caregiver.   - Collaborate with rehabilitation services on mobility goals if consulted  - Ambulate patient 3 times a day  - Out of bed to chair 1 times a day   - Out of bed for toileting  - Record patient progress and toleration of activity level   Outcome: Progressing     Problem: DISCHARGE PLANNING  Goal: Discharge to home or other facility with appropriate resources  Description: INTERVENTIONS:  - Identify barriers to discharge w/patient and caregiver  - Arrange for needed discharge resources and transportation as appropriate  - Identify discharge learning needs (meds, wound care, etc.)  - Arrange for interpretive services to assist at discharge as needed  - Refer to Case Management Department for coordinating discharge planning if the patient needs post-hospital services based on physician/advanced practitioner order or complex needs related to functional status, cognitive ability, or  social support system  Outcome: Progressing     Problem: Prexisting or High Potential for Compromised Skin Integrity  Goal: Skin integrity is maintained or improved  Description: INTERVENTIONS:  - Identify patients at risk for skin breakdown  - Assess and monitor skin integrity  - Assess and monitor nutrition and hydration status  - Monitor labs   - Assess for incontinence   - Turn and reposition patient  - Assist with mobility/ambulation  - Relieve pressure over bony prominences  - Avoid friction and shearing  - Provide appropriate hygiene as needed including keeping skin clean and dry  - Evaluate need for skin moisturizer/barrier cream  - Collaborate with interdisciplinary team   - Patient/family teaching  - Consider wound care consult   Outcome: Progressing

## 2024-04-02 NOTE — PLAN OF CARE
Problem: OCCUPATIONAL THERAPY ADULT  Goal: Performs self-care activities at highest level of function for planned discharge setting.  See evaluation for individualized goals.  Description: Treatment Interventions: ADL retraining, Functional transfer training, Endurance training, Patient/family training, Equipment evaluation/education, Fine motor coordination activities, Compensatory technique education, Continued evaluation, Energy conservation, Activityengagement          See flowsheet documentation for full assessment, interventions and recommendations.   4/2/2024 1558 by Mumtaz Nayak OT  Note: Limitation: Decreased ADL status, Decreased Safe judgement during ADL, Decreased endurance, Decreased self-care trans, Decreased high-level ADLs  Prognosis: Good  Assessment: 73 year old pt seen today for an OT evaluation following admission to [facility] 2/2 Shingles, back pain with present symptoms significant for fatigue, weakness, decreased functional mobility, decreased ADL status, pain. Pt has no past medical history on file. But reports recent work injury causing back pain. Pt reported that he lives with his supportive wife, daughter, and grandkids in a 2SH, SU available with 3-4STE. Pt reports being IND with all ADLs, has assist for IADLs. +. Pt very pleasant and cooperative t/o session. Pt completed functional bed mobility with SUP. SUP  for functional STS txfs with SPC. SUP for functional mobility to go short household distances in room with SPC. Pt was SUP for UB ADLs and Min A for LB ADLs. The patient's raw score on the AM-PAC Daily Activity Inpatient Short Form is 19. A raw score of greater than or equal to 19 suggests the patient may benefit from discharge to home. Please refer to the recommendation of the Occupational Therapist for safe discharge planning. Pt is functioning below baseline level of function and will continue to benefit from skilled acute OT to promote increased  independence and return to PLOF. At this time, current OT recommendation is Level 3 resources upon d/c.     Rehab Resource Intensity Level, OT: III (Minimum Resource Intensity)

## 2024-04-02 NOTE — ED NOTES
Pt's daughter, Shasta requesting to be updated, can be reached at [phone].      Karishma Singh  04/01/24 2009

## 2024-04-02 NOTE — PLAN OF CARE
Problem: PAIN - ADULT  Goal: Verbalizes/displays adequate comfort level or baseline comfort level  Description: Interventions:  - Encourage patient to monitor pain and request assistance  - Assess pain using appropriate pain scale  - Administer analgesics based on type and severity of pain and evaluate response  - Implement non-pharmacological measures as appropriate and evaluate response  - Consider cultural and social influences on pain and pain management  - Notify physician/advanced practitioner if interventions unsuccessful or patient reports new pain  Outcome: Progressing     Problem: INFECTION - ADULT  Goal: Absence or prevention of progression during hospitalization  Description: INTERVENTIONS:  - Assess and monitor for signs and symptoms of infection  - Monitor lab/diagnostic results  - Monitor all insertion sites, i.e. indwelling lines, tubes, and drains  - Monitor endotracheal if appropriate and nasal secretions for changes in amount and color  - [place] appropriate cooling/warming therapies per order  - Administer medications as ordered  - Instruct and encourage patient and family to use good hand hygiene technique  - Identify and instruct in appropriate isolation precautions for identified infection/condition  Outcome: Progressing  Goal: Absence of fever/infection during neutropenic period  Description: INTERVENTIONS:  - Monitor WBC    Outcome: Progressing     Problem: DISCHARGE PLANNING  Goal: Discharge to home or other facility with appropriate resources  Description: INTERVENTIONS:  - Identify barriers to discharge w/patient and caregiver  - Arrange for needed discharge resources and transportation as appropriate  - Identify discharge learning needs (meds, wound care, etc.)  - Arrange for interpretive services to assist at discharge as needed  - Refer to Case Management Department for coordinating discharge planning if the patient needs post-hospital services based on physician/advanced  practitioner order or complex needs related to functional status, cognitive ability, or social support system  Outcome: Progressing     Problem: Knowledge Deficit  Goal: Patient/family/caregiver demonstrates understanding of disease process, treatment plan, medications, and discharge instructions  Description: Complete learning assessment and assess knowledge base.  Interventions:  - Provide teaching at level of understanding  - Provide teaching via preferred learning methods  Outcome: Progressing

## 2024-04-03 VITALS
WEIGHT: 220 LBS | TEMPERATURE: 98.2 F | SYSTOLIC BLOOD PRESSURE: 143 MMHG | HEART RATE: 74 BPM | RESPIRATION RATE: 20 BRPM | DIASTOLIC BLOOD PRESSURE: 71 MMHG | HEIGHT: 68 IN | BODY MASS INDEX: 33.34 KG/M2 | OXYGEN SATURATION: 95 %

## 2024-04-03 LAB
ANION GAP SERPL CALCULATED.3IONS-SCNC: 9 MMOL/L (ref 4–13)
BUN SERPL-MCNC: 21 MG/DL (ref 5–25)
CALCIUM SERPL-MCNC: 7 MG/DL (ref 8.4–10.2)
CHLORIDE SERPL-SCNC: 101 MMOL/L (ref 96–108)
CO2 SERPL-SCNC: 23 MMOL/L (ref 21–32)
CREAT SERPL-MCNC: 1.01 MG/DL (ref 0.6–1.3)
ERYTHROCYTE [DISTWIDTH] IN BLOOD BY AUTOMATED COUNT: 13.5 % (ref 11.6–15.1)
GFR SERPL CREATININE-BSD FRML MDRD: 74 ML/MIN/1.73SQ M
GLUCOSE SERPL-MCNC: 164 MG/DL (ref 65–140)
HCT VFR BLD AUTO: 43.5 % (ref 36.5–49.3)
HGB BLD-MCNC: 14.1 G/DL (ref 12–17)
MCH RBC QN AUTO: 29.8 PG (ref 26.8–34.3)
MCHC RBC AUTO-ENTMCNC: 32.4 G/DL (ref 31.4–37.4)
MCV RBC AUTO: 92 FL (ref 82–98)
PLATELET # BLD AUTO: 120 THOUSANDS/UL (ref 149–390)
PMV BLD AUTO: 10.4 FL (ref 8.9–12.7)
POTASSIUM SERPL-SCNC: 4.6 MMOL/L (ref 3.5–5.3)
RBC # BLD AUTO: 4.73 MILLION/UL (ref 3.88–5.62)
SODIUM SERPL-SCNC: 133 MMOL/L (ref 135–147)
WBC # BLD AUTO: 8.54 THOUSAND/UL (ref 4.31–10.16)

## 2024-04-03 PROCEDURE — 99239 HOSP IP/OBS DSCHRG MGMT >30: CPT | Performed by: PHYSICIAN ASSISTANT

## 2024-04-03 PROCEDURE — 85027 COMPLETE CBC AUTOMATED: CPT | Performed by: PHYSICIAN ASSISTANT

## 2024-04-03 PROCEDURE — 80048 BASIC METABOLIC PNL TOTAL CA: CPT | Performed by: PHYSICIAN ASSISTANT

## 2024-04-03 RX ORDER — VALACYCLOVIR HYDROCHLORIDE 1 G/1
1000 TABLET, FILM COATED ORAL 3 TIMES DAILY
Qty: 15 TABLET | Refills: 0 | Status: SHIPPED | OUTPATIENT
Start: 2024-04-03 | End: 2024-04-08

## 2024-04-03 RX ADMIN — SODIUM CHLORIDE, SODIUM GLUCONATE, SODIUM ACETATE, POTASSIUM CHLORIDE, MAGNESIUM CHLORIDE, SODIUM PHOSPHATE, DIBASIC, AND POTASSIUM PHOSPHATE 100 ML/HR: .53; .5; .37; .037; .03; .012; .00082 INJECTION, SOLUTION INTRAVENOUS at 05:23

## 2024-04-03 RX ADMIN — VALACYCLOVIR HYDROCHLORIDE 1000 MG: 1 TABLET, FILM COATED ORAL at 02:48

## 2024-04-03 RX ADMIN — DILTIAZEM HYDROCHLORIDE 240 MG: 240 CAPSULE, COATED, EXTENDED RELEASE ORAL at 09:13

## 2024-04-03 RX ADMIN — OXYCODONE HYDROCHLORIDE 5 MG: 5 TABLET ORAL at 02:56

## 2024-04-03 NOTE — ASSESSMENT & PLAN NOTE
Chronic right sided lumbar spine with radiculopathy  Follows with neurosurgery in [place] where he resides  Recent steroid taper was terminated early due to side affects  Outpatient follow up

## 2024-04-03 NOTE — ASSESSMENT & PLAN NOTE
Presented with new right lower back rash consistent with shingles  Possibly precipitated by a recent steroid course he used for his radiculopathy  Valtrex x 7 days

## 2024-04-03 NOTE — PLAN OF CARE
Problem: PAIN - ADULT  Goal: Verbalizes/displays adequate comfort level or baseline comfort level  Description: Interventions:  - Encourage patient to monitor pain and request assistance  - Assess pain using appropriate pain scale  - Administer analgesics based on type and severity of pain and evaluate response  - Implement non-pharmacological measures as appropriate and evaluate response  - Consider cultural and social influences on pain and pain management  - Notify physician/advanced practitioner if interventions unsuccessful or patient reports new pain  Outcome: Progressing     Problem: INFECTION - ADULT  Goal: Absence or prevention of progression during hospitalization  Description: INTERVENTIONS:  - Assess and monitor for signs and symptoms of infection  - Monitor lab/diagnostic results  - Monitor all insertion sites, i.e. indwelling lines, tubes, and drains  - Monitor endotracheal if appropriate and nasal secretions for changes in amount and color  - [place] appropriate cooling/warming therapies per order  - Administer medications as ordered  - Instruct and encourage patient and family to use good hand hygiene technique  - Identify and instruct in appropriate isolation precautions for identified infection/condition  Outcome: Progressing  Goal: Absence of fever/infection during neutropenic period  Description: INTERVENTIONS:  - Monitor WBC    Outcome: Progressing     Problem: SAFETY ADULT  Goal: Patient will remain free of falls  Description: INTERVENTIONS:  - Educate patient/family on patient safety including physical limitations  - Instruct patient to call for assistance with activity   - Consult OT/PT to assist with strengthening/mobility   - Keep Call bell within reach  - Keep bed low and locked with side rails adjusted as appropriate  - Keep care items and personal belongings within reach  - Initiate and maintain comfort rounds  - Make Fall Risk Sign visible to staff  - Offer Toileting every 2 Hours,  in advance of need    Outcome: Progressing  Goal: Maintain or return to baseline ADL function  Description: INTERVENTIONS:  -  Assess patient's ability to carry out ADLs; assess patient's baseline for ADL function and identify physical deficits which impact ability to perform ADLs (bathing, care of mouth/teeth, toileting, grooming, dressing, etc.)  - Assess/evaluate cause of self-care deficits   - Assess range of motion  - Assess patient's mobility; develop plan if impaired  - Assess patient's need for assistive devices and provide as appropriate  - Encourage maximum independence but intervene and supervise when necessary  - Involve family in performance of ADLs  - Assess for home care needs following discharge   - Consider OT consult to assist with ADL evaluation and planning for discharge  - Provide patient education as appropriate  Outcome: Progressing  Goal: Maintains/Returns to pre admission functional level  Description: INTERVENTIONS:  - Perform AM-PAC 6 Click Basic Mobility/ Daily Activity assessment daily.  - Set and communicate daily mobility goal to care team and patient/family/caregiver.   - Stand patient 3 times a day  - Ambulate patient 3 times a day  - Out of bed to chair 3 times a day   - Out of bed for meals 3 times a day  - Out of bed for toileting  - Record patient progress and toleration of activity level   Outcome: Progressing     Problem: DISCHARGE PLANNING  Goal: Discharge to home or other facility with appropriate resources  Description: INTERVENTIONS:  - Identify barriers to discharge w/patient and caregiver  - Arrange for needed discharge resources and transportation as appropriate  - Identify discharge learning needs (meds, wound care, etc.)  - Arrange for interpretive services to assist at discharge as needed  - Refer to Case Management Department for coordinating discharge planning if the patient needs post-hospital services based on physician/advanced practitioner order or complex needs  related to functional status, cognitive ability, or social support system  Outcome: Progressing     Problem: Knowledge Deficit  Goal: Patient/family/caregiver demonstrates understanding of disease process, treatment plan, medications, and discharge instructions  Description: Complete learning assessment and assess knowledge base.  Interventions:  - Provide teaching at level of understanding  - Provide teaching via preferred learning methods  Outcome: Progressing     Problem: Prexisting or High Potential for Compromised Skin Integrity  Goal: Skin integrity is maintained or improved  Description: INTERVENTIONS:  - Identify patients at risk for skin breakdown  - Assess and monitor skin integrity  - Assess and monitor nutrition and hydration status  - Monitor labs   - Assess for incontinence   - Turn and reposition patient  - Assist with mobility/ambulation  - Relieve pressure over bony prominences  - Avoid friction and shearing  - Provide appropriate hygiene as needed including keeping skin clean and dry  - Evaluate need for skin moisturizer/barrier cream  - Collaborate with interdisciplinary team   - Patient/family teaching  - Consider wound care consult   Outcome: Progressing

## 2024-04-03 NOTE — DISCHARGE SUMMARY
"[facility]  Discharge- Bharat Ordoñez 1950, 73 y.o. male MRN: [id number]  [facility] Encounter: [id number]  Primary Care Provider: No primary care provider on file.   Date and time admitted to hospital: 4/1/2024 11:57 AM    * Shingles  Assessment & Plan  Presented with new right lower back rash consistent with shingles  Possibly precipitated by a recent steroid course he used for his radiculopathy  Valtrex x 7 days - discussed dosing with Pharmacist prior to d/c    Acute renal failure (HCC)  Assessment & Plan  POA aeb creatinine of 1.67. limited prior lab records available for review, however in Care Everywhere it appears that baseline creatinine in 2002 was around 0.6-0.8  Creatinine has improved to 1.01  Follow up with PCP    Nausea  Assessment & Plan  Unclear etiology, now resolved  KUB was ordered on admission revealed unremarkable abdomen per the results report     HTN (hypertension)  Assessment & Plan  Continue home meds    Lumbar radiculopathy  Assessment & Plan  Chronic right sided lumbar spine with radiculopathy  Follows with neurosurgery in [place] where he resides  Recent steroid taper was terminated early due to side affects  Outpatient follow up      Medical Problems       Resolved Problems  Date Reviewed: 4/3/2024   None       Discharging Physician / Practitioner: Jesús Bowen PA-C  PCP: No primary care provider on file.  Admission Date:   Admission Orders (From admission, onward)       Ordered        04/01/24 1513  INPATIENT ADMISSION  Once            04/01/24 1408  INPATIENT ADMISSION  Once,   Status:  Canceled                          Discharge Date: 04/03/24    Consultations During Hospital Stay:  PT  OT    Procedures Performed:   CXR  XR abdomen KUB  CMP/BMP  HS troponin  BNP  Lactic acid  Procal  CBC  TSH  FLU/RSV/COVID    Significant Findings / Test Results:   CXR: \"no acute cardiopulmonary disease\"  XR abdomen KUB: \"unremarkable " "abdomen\"  Creatinine: 1.67, 1.15, 1.01  Hyponatremia  Hyperglycemia  HS troponin: 7, 6, 8  Lactic acid: 2.7, 1.3  Procal: 0.53  TSH: 2.630  FLU/RSV/COVID: negative    Incidental Findings:   None     Test Results Pending at Discharge (will require follow up):   None     Outpatient Tests Requested:  Follow up with PCP    Complications:  None    Reason for Admission: shingles, DARIELA    Hospital Course:   Bharat Ordoñez is a 73 y.o. male with HTN and lumbar radiculopathy who originally presented to the hospital on 4/1/2024 due to pain pain. Was apparently participating with outpatient PT and was noticed to have a rash on the lower back suspicious for shingles and was sent to the ED. He also reports nausea, poor appetite, and was found to have suspected DARIELA with creatinine of 1.67. he was started on Valtrex. He was placed on IVF hydration and his home HCTZ was held. Renal function improved.     Please see above list of diagnoses and related plan for additional information.     Condition at Discharge: stable    Discharge Day Visit / Exam:   Subjective:   Mr. Ordoñez reports wanting to be discharged. Denies CP, SOB, abdominal pain, nausea, vomiting     Vitals: Blood Pressure: 143/71 (04/03/24 0802)  Pulse: 74 (04/03/24 0802)  Temperature: 98.2 °F (36.8 °C) (04/03/24 0802)  Temp Source: Temporal (04/01/24 1152)  Respirations: 20 (04/03/24 0802)  Height: 5' 8\" (172.7 cm) (04/01/24 2105)  Weight - Scale: 99.8 kg (220 lb) (04/01/24 2105)  SpO2: 95 % (04/03/24 0802)  Exam:   Physical Exam  Vitals reviewed.   Constitutional:       Comments: Patient seen sitting in bed, NAD   Cardiovascular:      Rate and Rhythm: Normal rate and regular rhythm.   Pulmonary:      Effort: Pulmonary effort is normal. No respiratory distress.      Breath sounds: Normal breath sounds.   Abdominal:      General: Bowel sounds are normal.      Palpations: Abdomen is soft.      Tenderness: There is no abdominal tenderness.   Musculoskeletal:      Right lower " leg: No edema.      Left lower leg: No edema.   Skin:     General: Skin is warm.      Findings: Rash (dermatomal, back) present.   Neurological:      Mental Status: He is alert and oriented to person, place, and time.   Psychiatric:         Mood and Affect: Mood normal.         Behavior: Behavior normal.          Discussion with Family: Patient declined call to .     Discharge instructions/Information to patient and family:   See after visit summary for information provided to patient and family.      Provisions for Follow-Up Care:  See after visit summary for information related to follow-up care and any pertinent home health orders.      Mobility at time of Discharge:   Basic Mobility Inpatient Raw Score: 24  JH-HLM Goal: 8: Walk 250 feet or more  JH-HLM Achieved: 6: Walk 10 steps or more  HLM Goal NOT achieved. Continue to encourage mobility in post discharge setting.     Disposition:   Home with VNA Services (Reminder: Complete face to face encounter)    Planned Readmission: None     Discharge Statement:  I spent 40 minutes discharging the patient. This time was spent on the day of discharge. I had direct contact with the patient on the day of discharge. Greater than 50% of the total time was spent examining patient, answering all patient questions, arranging and discussing plan of care with patient as well as directly providing post-discharge instructions.  Additional time then spent on discharge activities.    Discharge Medications:  See after visit summary for reconciled discharge medications provided to patient and/or family.      **Please Note: This note may have been constructed using a voice recognition system**

## 2024-04-03 NOTE — ASSESSMENT & PLAN NOTE
Unclear etiology, now resolved  KUB was ordered on admission revealed unremarkable abdomen per the results report

## 2024-04-03 NOTE — ASSESSMENT & PLAN NOTE
POA aeb creatinine of 1.67. limited prior lab records available for review, however in Care Everywhere it appears that baseline creatinine in 2002 was around 0.6-0.8  Creatinine has improved to 1.01  Follow up with PCP

## 2024-04-03 NOTE — CASE MANAGEMENT
Case Management Discharge Planning Note    Patient name Bharat Ordoñez  Location [facility] 819/[facility] 819-01 MRN [id number]  : 1950 Date 4/3/2024       Current Admission Date: 2024  Current Admission Diagnosis:Shingles   Patient Active Problem List    Diagnosis Date Noted    Shingles 2024    Acute renal failure (HCC) 2024    Nausea 2024    HTN (hypertension) 2024    Lumbar radiculopathy 2024      LOS (days): 2  Geometric Mean LOS (GMLOS) (days): 3.1  Days to GMLOS:1.1     OBJECTIVE:  Risk of Unplanned Readmission Score: 10.27         Current admission status: Inpatient   Preferred Pharmacy:   [id number] - [place], [place] - [address]  [address]  [place] [address]  Phone: [phone] Fax: [phone]    [facility] [place] - [address]  [address]  [place] [address]  Phone: [phone] Fax: [phone]    Primary Care Provider: No primary care provider on file.    Primary Insurance: [employer]  Secondary Insurance: [place] MEDICAID    DISCHARGE DETAILS:           Pt clear to d/c home. No post acute care needs  Will continue with OP PT.  NO IMM needed.

## 2024-04-04 NOTE — UTILIZATION REVIEW
NOTIFICATION OF ADMISSION DISCHARGE   This is a Notification of Discharge from [facility]. Please be advised that this patient has been discharge from our facility. Below you will find the admission and discharge date and time including the patient’s disposition.   UTILIZATION REVIEW CONTACT:  Matthias Menendez  Utilization   Network Utilization Review Department  Phone: [phone] x carefully listen to the prompts. All voicemails are confidential.  Email: [email or web address]     ADMISSION INFORMATION  PRESENTATION DATE: 4/1/2024 11:57 AM  OBERVATION ADMISSION DATE:   INPATIENT ADMISSION DATE: 4/1/24  2:08 PM   DISCHARGE DATE: 4/3/2024  1:43 PM   DISPOSITION:Home with Home Health Care    Network Utilization Review Department  ATTENTION: Please call with any questions or concerns to [phone] and carefully listen to the prompts so that you are directed to the right person. All voicemails are confidential.   For Discharge needs, contact Care Management DC Support Team at [phone] opt. [phone]  Send all requests for admission clinical reviews, approved or denied determinations and any other requests to dedicated fax number below belonging to the campus where the patient is receiving treatment. List of dedicated fax numbers for the Facilities:  FACILITY NAME UR FAX NUMBER   ADMISSION DENIALS (Administrative/Medical Necessity) [phone]   DISCHARGE SUPPORT TEAM ([facility]) [phone]   PARENT CHILD HEALTH (Maternity/NICU/Pediatrics) [phone]   [facility] [phone]   [facility] [phone]   [facility] [phone]   [facility] [phone]   [facility] [phone]   [facility] [phone]   [facility] [phone]   [facility] [phone]    [facility] [phone]   [facility] [phone]   General acute hospital [phone]   [facility] [phone]

## 2024-04-06 LAB
BACTERIA BLD CULT: NORMAL
BACTERIA BLD CULT: NORMAL